# Patient Record
Sex: FEMALE | Race: WHITE | NOT HISPANIC OR LATINO | Employment: UNEMPLOYED | URBAN - NONMETROPOLITAN AREA
[De-identification: names, ages, dates, MRNs, and addresses within clinical notes are randomized per-mention and may not be internally consistent; named-entity substitution may affect disease eponyms.]

---

## 2017-01-12 ENCOUNTER — HOSPITAL ENCOUNTER (OUTPATIENT)
Dept: GENERAL RADIOLOGY | Facility: HOSPITAL | Age: 57
Discharge: HOME | End: 2017-01-12
Attending: OBSTETRICS & GYNECOLOGY

## 2017-12-19 ENCOUNTER — TRANSCRIBE ORDERS (OUTPATIENT)
Dept: ADMINISTRATIVE | Facility: HOSPITAL | Age: 57
End: 2017-12-19

## 2017-12-19 DIAGNOSIS — Z12.39 SCREENING FOR BREAST CANCER: Primary | ICD-10-CM

## 2018-01-18 ENCOUNTER — APPOINTMENT (OUTPATIENT)
Dept: MAMMOGRAPHY | Facility: HOSPITAL | Age: 58
End: 2018-01-18

## 2018-02-12 ENCOUNTER — HOSPITAL ENCOUNTER (OUTPATIENT)
Dept: MAMMOGRAPHY | Facility: HOSPITAL | Age: 58
Discharge: HOME | End: 2018-02-12
Admitting: OBSTETRICS & GYNECOLOGY

## 2018-02-12 DIAGNOSIS — Z12.39 SCREENING FOR BREAST CANCER: ICD-10-CM

## 2018-02-12 PROCEDURE — 77067 SCR MAMMO BI INCL CAD: CPT

## 2018-02-12 PROCEDURE — 77063 BREAST TOMOSYNTHESIS BI: CPT

## 2018-12-27 ENCOUNTER — TRANSCRIBE ORDERS (OUTPATIENT)
Dept: ADMINISTRATIVE | Facility: HOSPITAL | Age: 58
End: 2018-12-27

## 2018-12-27 DIAGNOSIS — Z12.31 ENCOUNTER FOR SCREENING MAMMOGRAM FOR MALIGNANT NEOPLASM OF BREAST: Primary | ICD-10-CM

## 2019-02-15 ENCOUNTER — HOSPITAL ENCOUNTER (OUTPATIENT)
Dept: MAMMOGRAPHY | Facility: HOSPITAL | Age: 59
Discharge: HOME | End: 2019-02-15
Admitting: OBSTETRICS & GYNECOLOGY

## 2019-02-15 DIAGNOSIS — Z12.31 ENCOUNTER FOR SCREENING MAMMOGRAM FOR MALIGNANT NEOPLASM OF BREAST: ICD-10-CM

## 2019-02-15 PROCEDURE — 77063 BREAST TOMOSYNTHESIS BI: CPT

## 2019-02-15 PROCEDURE — 77067 SCR MAMMO BI INCL CAD: CPT

## 2019-05-10 ENCOUNTER — OFFICE VISIT (OUTPATIENT)
Dept: OBSTETRICS AND GYNECOLOGY | Facility: CLINIC | Age: 59
End: 2019-05-10

## 2019-05-10 VITALS — HEIGHT: 63 IN | BODY MASS INDEX: 33.49 KG/M2 | WEIGHT: 189 LBS

## 2019-05-10 DIAGNOSIS — N76.0 ACUTE VAGINITIS: ICD-10-CM

## 2019-05-10 DIAGNOSIS — N90.89 LESION OF LABIA: ICD-10-CM

## 2019-05-10 DIAGNOSIS — N95.2 POSTMENOPAUSAL ATROPHIC VAGINITIS: Primary | ICD-10-CM

## 2019-05-10 PROCEDURE — 99204 OFFICE O/P NEW MOD 45 MIN: CPT | Performed by: OBSTETRICS & GYNECOLOGY

## 2019-05-10 RX ORDER — RANITIDINE 300 MG/1
TABLET ORAL
COMMUNITY
Start: 2019-04-18

## 2019-05-10 RX ORDER — URSODIOL 300 MG/1
1200 CAPSULE ORAL DAILY
Refills: 5 | COMMUNITY
Start: 2019-05-03

## 2019-05-10 RX ORDER — FLUTICASONE PROPIONATE 50 MCG
SPRAY, SUSPENSION (ML) NASAL
COMMUNITY
Start: 2019-04-18

## 2019-05-10 RX ORDER — CEFPODOXIME PROXETIL 200 MG/1
TABLET, FILM COATED ORAL
COMMUNITY
Start: 2019-02-19

## 2019-05-10 RX ORDER — ESTRADIOL 0.1 MG/G
CREAM VAGINAL
Qty: 1 EACH | Refills: 11 | Status: SHIPPED | OUTPATIENT
Start: 2019-05-10 | End: 2019-05-13 | Stop reason: SDUPTHER

## 2019-05-10 RX ORDER — METFORMIN HYDROCHLORIDE 500 MG/1
TABLET, EXTENDED RELEASE ORAL
COMMUNITY
Start: 2019-04-18

## 2019-05-10 RX ORDER — OMEGA-3-ACID ETHYL ESTERS 1 G/1
4 CAPSULE, LIQUID FILLED ORAL DAILY
Refills: 0 | COMMUNITY
Start: 2019-04-08

## 2019-05-10 RX ORDER — ALLOPURINOL 100 MG/1
TABLET ORAL
COMMUNITY
Start: 2019-04-18

## 2019-05-10 RX ORDER — LEVOTHYROXINE SODIUM 75 MCG
TABLET ORAL
COMMUNITY
Start: 2019-04-18

## 2019-05-10 RX ORDER — PROPRANOLOL HYDROCHLORIDE 10 MG/1
TABLET ORAL
Refills: 5 | COMMUNITY
Start: 2019-05-03

## 2019-05-10 RX ORDER — SERTRALINE HYDROCHLORIDE 25 MG/1
TABLET, FILM COATED ORAL
COMMUNITY
Start: 2019-04-18

## 2019-05-10 RX ORDER — ERGOCALCIFEROL 1.25 MG/1
CAPSULE ORAL
COMMUNITY
Start: 2019-04-18

## 2019-05-10 RX ORDER — LOSARTAN POTASSIUM 50 MG/1
TABLET ORAL
COMMUNITY
Start: 2019-04-18

## 2019-05-10 RX ORDER — PSEUDOEPHEDRINE HCL 30 MG/1
TABLET, FILM COATED ORAL
Refills: 0 | COMMUNITY
Start: 2019-02-13

## 2019-05-10 NOTE — PROGRESS NOTES
Subjective  Chief Complaint   Patient presents with   • LABIAL LESION     Patient advised has had sore lesion/bump on right labia x 4-5 months, also complains of recurrent yeast infections.      Patient is 59 y.o.  here as a new patient for evaluation of recurrent swelling of the vagina, skin lesion of the right labia.  Patient gives a history of having swelling and pain of the vagina and vulva for the last 4 to 5 months.  Patient reports she will have intense itching and irritation.  Patient reports she is used hot compresses as well as has tried medications over-the-counter.  Patient reports no improvement in her symptoms.  Patient reports also having a lesion of the right labia.  Patient reports the lesion has gotten smaller in nature.  Patient denies any drainage from the area patient.  Patient reports she can still feel a small nodule.  Patient reports her significant other could also feel the lesion.  The patient has had a previous hysterectomy with removal of her ovaries and tubes.  Patient is not on any hormone therapy orally or topically.  Patient reports being on hormone replacement therapy for a short time following her hysterectomy.    History  Past Medical History:   Diagnosis Date   • Anxiety    • Cirrhosis (CMS/HCC)    • Depression    • Diabetes mellitus (CMS/HCC)    • Disease of thyroid gland    • Gout    • Gout    • Hyperlipidemia    • Hypertension    • Liver cirrhosis (CMS/HCC)    • Osteoarthritis    • Urinary tract infection      Current Outpatient Medications on File Prior to Visit   Medication Sig Dispense Refill   • allopurinol (ZYLOPRIM) 100 MG tablet      • cefpodoxime (VANTIN) 200 MG tablet      • fluticasone (FLONASE) 50 MCG/ACT nasal spray      • losartan (COZAAR) 50 MG tablet      • metFORMIN ER (GLUCOPHAGE-XR) 500 MG 24 hr tablet      • NASAL DECONGESTANT 30 MG tablet   0   • omega-3 acid ethyl esters (LOVAZA) 1 g capsule Take 4 g by mouth Daily.  0   • ONE TOUCH ULTRA TEST test  "strip   3   • propranolol (INDERAL) 10 MG tablet TAKE 1/2 TABLET BY MOUTH IN THE MORNING AND 1 TABLET IN THE EVENING.  5   • raNITIdine (ZANTAC) 300 MG tablet      • sertraline (ZOLOFT) 25 MG tablet      • SYNTHROID 75 MCG tablet      • ursodiol (ACTIGALL) 300 MG capsule Take 1,200 mg by mouth Daily.  5   • vitamin D (ERGOCALCIFEROL) 53362 units capsule capsule        No current facility-administered medications on file prior to visit.      Allergies   Allergen Reactions   • Lipitor [Atorvastatin Calcium] Other (See Comments)     Patient advised unable to move arms or legs.    • Peanut-Containing Drug Products Swelling   • Ciprofloxacin Rash     Past Surgical History:   Procedure Laterality Date   • BILATERAL SALPINGO OOPHORECTOMY     • HYSTERECTOMY      LAVH   • PARATHYROIDECTOMY     • SPLENECTOMY       Family History   Problem Relation Age of Onset   • Coronary artery disease Father    • Colon cancer Mother    • Hypertension Mother    • Stroke Maternal Grandmother      Social History     Socioeconomic History   • Marital status:      Spouse name: Not on file   • Number of children: Not on file   • Years of education: Not on file   • Highest education level: Not on file   Tobacco Use   • Smoking status: Never Smoker   • Smokeless tobacco: Never Used   Substance and Sexual Activity   • Alcohol use: No     Frequency: Never   • Drug use: No   • Sexual activity: Not Currently     Partners: Male     Birth control/protection: Surgical     Review of Systems  All systems were reviewed and negative except for:  Constitution:  positive for trouble sleeping  ENT:  positive for nasal congestion  Gastrointestinal: postitive for  diarrhea  Musculoskeletal: positive for  joint pain  Behavioral/Psych: positive for  depression  Endocrine: positive for  hot flashes     Objective  Vitals:    05/10/19 1047   Weight: 85.7 kg (189 lb)   Height: 160 cm (63\")     Physical Exam:  General Appearance: alert, appears stated age and " cooperative  Head: normocephalic, without obvious abnormality and atraumatic  Eyes: lids and lashes normal, conjunctivae and sclerae normal, no icterus, no pallor, corneas clear and PERRLA  Ears: ears appear intact with no abnormalities noted  Nose: nares normal, septum midline, mucosa normal and no drainage  Neck: suppple, trachea midline and no thyromegaly  Lungs: clear to auscultation, respirations regular, respirations even and respirations unlabored  Heart: regular rhythm and normal rate, normal S1, S2, no murmur, gallop, or rubs and no click  Breasts: Not performed.  Abdomen: normal bowel sounds, no masses, no hepatomegaly, no splenomegaly, soft non-tender, no guarding and no rebound tenderness  Pelvic: Clinical staff was present for exam  External genitalia:  normal appearance of the external genitalia including Bartholin's and Barber's glands.  :  urethral meatus normal;  Vaginal:  atrophic mucosal changes are present; discharge present -  mucousy and white;  Cervix:  absent.  Uterus:  absent.  Adnexa:  non palpable bilaterally.  Extremities: moves extremities well, no edema, no cyanosis and no redness  Skin: no bleeding, bruising or rash and no lesions noted  Lymph Nodes: no palpable adenopathy  Neuro: CN II-X grossly intact; sensation intact  Psych: normal mood and affect, oriented to person, time and place, thought content organized and appropriate judgment  Lab Review   No data reviewed    Imaging   No data reviewed    Assessment/Plan  Problem List Items Addressed This Visit     None      Visit Diagnoses     Postmenopausal atrophic vaginitis    -  Primary  Discussed various options for relief of atrophic vaginal symptoms related to menopause. Discussed local therapy for treatment of vaginal symptoms only.  Discussed the different formulation options including cream, ring, and tablets.  Discussed the low risk of systemic absorption in postmenopausal women with atrophy using 25 mcgs of estradiol on a  daily basis.  Recommend low dose use 2-3x/wk for maintenance of treatment.  Other treatment options were discussed including the use of water-based and silicone-based vaginal lubricants and moisturizers.  Also discussed was the FDA approved treatment option of ospemifene for moderate to severe dyspareunia.  The patient is to follow-up in 4 to 6 weeks if no improvement in her symptoms.    Relevant Medications    estradiol (ESTRACE VAGINAL) 0.1 MG/GM vaginal cream    Acute vaginitis      Patient with vaginal discharge as noted today upon examination.  Cultures have been obtained today.  Plan pending results.  Patient gives a history of having frequent infections as noted above.  Will hold on treatment pending culture results.    Relevant Orders    NuSwab VG+ - Swab, Vagina (Completed)    Lesion of labia      Patient reports having a lesion of the right labia.  There are no abnormalities noted today. Patient's history is consistent with a probable sebaceous cyst.  Instructions and precautions have been given.  Patient is to follow-up with return of her symptoms.        Follow up as discussed/scheduled  This note was electronically signed.  Jemima Weathers M.D.

## 2019-05-13 LAB
A VAGINAE DNA VAG QL NAA+PROBE: NORMAL SCORE
BVAB2 DNA VAG QL NAA+PROBE: NORMAL SCORE
C ALBICANS DNA VAG QL NAA+PROBE: NEGATIVE
C GLABRATA DNA VAG QL NAA+PROBE: NEGATIVE
C TRACH RRNA SPEC QL NAA+PROBE: NEGATIVE
MEGA1 DNA VAG QL NAA+PROBE: NORMAL SCORE
N GONORRHOEA RRNA SPEC QL NAA+PROBE: NEGATIVE
T VAGINALIS RRNA SPEC QL NAA+PROBE: NEGATIVE

## 2019-05-13 RX ORDER — ESTRADIOL 0.1 MG/G
CREAM VAGINAL
Qty: 1 EACH | Refills: 11 | Status: SHIPPED | OUTPATIENT
Start: 2019-05-13 | End: 2019-05-13 | Stop reason: SDUPTHER

## 2019-05-13 RX ORDER — ESTRADIOL 0.1 MG/G
CREAM VAGINAL
Qty: 42.5 G | Refills: 11 | Status: SHIPPED | OUTPATIENT
Start: 2019-05-13 | End: 2019-05-17

## 2019-05-14 ENCOUNTER — TELEPHONE (OUTPATIENT)
Dept: OBSTETRICS AND GYNECOLOGY | Facility: CLINIC | Age: 59
End: 2019-05-14

## 2019-05-14 NOTE — TELEPHONE ENCOUNTER
----- Message from Britt Noyola sent at 5/14/2019 10:43 AM EDT -----  Contact: PT  PT SAW DR BARRON YESTERDAY AND WAS PRESCRIBED ESTRACE CREAM.  SHE SAID HER INSURANCE WILL COVER ONLY PREMARIN CREAM.  PLEASE SEND TO [facility]  THANKS

## 2020-01-02 ENCOUNTER — TRANSCRIBE ORDERS (OUTPATIENT)
Dept: ADMINISTRATIVE | Facility: HOSPITAL | Age: 60
End: 2020-01-02

## 2020-01-02 DIAGNOSIS — Z12.31 ENCOUNTER FOR SCREENING MAMMOGRAM FOR MALIGNANT NEOPLASM OF BREAST: Primary | ICD-10-CM

## 2020-02-17 ENCOUNTER — HOSPITAL ENCOUNTER (OUTPATIENT)
Dept: MAMMOGRAPHY | Facility: HOSPITAL | Age: 60
Discharge: HOME | End: 2020-02-17
Admitting: OBSTETRICS & GYNECOLOGY

## 2020-02-17 DIAGNOSIS — Z12.31 ENCOUNTER FOR SCREENING MAMMOGRAM FOR MALIGNANT NEOPLASM OF BREAST: ICD-10-CM

## 2020-02-17 PROCEDURE — 77067 SCR MAMMO BI INCL CAD: CPT

## 2020-02-17 PROCEDURE — 77063 BREAST TOMOSYNTHESIS BI: CPT

## 2020-10-26 ENCOUNTER — TRANSCRIBE ORDERS (OUTPATIENT)
Dept: ADMINISTRATIVE | Facility: HOSPITAL | Age: 60
End: 2020-10-26

## 2020-10-26 DIAGNOSIS — Z12.31 SCREENING MAMMOGRAM, ENCOUNTER FOR: Primary | ICD-10-CM

## 2021-02-19 ENCOUNTER — APPOINTMENT (OUTPATIENT)
Dept: MAMMOGRAPHY | Facility: HOSPITAL | Age: 61
End: 2021-02-19

## 2021-04-06 ENCOUNTER — HOSPITAL ENCOUNTER (OUTPATIENT)
Dept: MAMMOGRAPHY | Facility: HOSPITAL | Age: 61
Discharge: HOME | End: 2021-04-06
Admitting: OBSTETRICS & GYNECOLOGY

## 2021-04-06 DIAGNOSIS — Z12.31 SCREENING MAMMOGRAM, ENCOUNTER FOR: ICD-10-CM

## 2021-04-06 PROCEDURE — 77067 SCR MAMMO BI INCL CAD: CPT

## 2021-04-06 PROCEDURE — 77063 BREAST TOMOSYNTHESIS BI: CPT

## 2021-07-01 ENCOUNTER — OFFICE VISIT (OUTPATIENT)
Dept: OBSTETRICS AND GYNECOLOGY | Facility: CLINIC | Age: 61
End: 2021-07-01

## 2021-07-01 VITALS
HEIGHT: 63 IN | BODY MASS INDEX: 34.38 KG/M2 | DIASTOLIC BLOOD PRESSURE: 74 MMHG | WEIGHT: 194 LBS | SYSTOLIC BLOOD PRESSURE: 138 MMHG

## 2021-07-01 DIAGNOSIS — N76.0 ACUTE VAGINITIS: ICD-10-CM

## 2021-07-01 DIAGNOSIS — N89.8 VAGINAL DISCHARGE: ICD-10-CM

## 2021-07-01 DIAGNOSIS — N95.2 POSTMENOPAUSAL ATROPHIC VAGINITIS: ICD-10-CM

## 2021-07-01 DIAGNOSIS — R30.0 DYSURIA: Primary | ICD-10-CM

## 2021-07-01 PROCEDURE — 99214 OFFICE O/P EST MOD 30 MIN: CPT | Performed by: OBSTETRICS & GYNECOLOGY

## 2021-07-01 RX ORDER — CEPHALEXIN 500 MG/1
500 CAPSULE ORAL 4 TIMES DAILY
Qty: 28 CAPSULE | Refills: 0 | Status: SHIPPED | OUTPATIENT
Start: 2021-07-01 | End: 2021-07-08

## 2021-07-01 RX ORDER — FLUCONAZOLE 150 MG/1
TABLET ORAL
Qty: 2 TABLET | Refills: 0 | Status: SHIPPED | OUTPATIENT
Start: 2021-07-01 | End: 2023-03-16

## 2021-07-05 NOTE — PROGRESS NOTES
"Chief Complaint  Follow-up (Patient c/o vaginal discharge and burning for 1 week )     History of Present Illness:  Patient is 61 y.o.  who presents to [facility] here for evaluation of a vaginal discharge as well as burning and irritation.  Patient reports the discharge has been clear in nature.  Patient reports it has been itchy and irritated.  She has also had significant burning.  The patient also reports having dysuria and frequency.  Patient denies any flank pain.  She denies any fever or chills.  The patient reports she has been using her estrogen cream occasionally as previously given.  She has not been using this on a regular basis.    Physical Examination:  Vital Signs: /74   Ht 160 cm (63\")   Wt 88 kg (194 lb)   BMI 34.37 kg/m²     General Appearance: alert, appears stated age, and cooperative  Breasts: Not performed.  Abdomen: no masses, no hepatomegaly, no splenomegaly, soft non-tender, no guarding and no rebound tenderness  Pelvic: Clinical staff was present for exam  External genitalia:  normal appearance of the external genitalia including Bartholin's and [place]'s glands.  :  urethral meatus normal;  Vaginal:  atrophic mucosal changes are present; discharge present -  mucousy and white;  Cervix:  absent.  Uterus:  absent.  Adnexa:  non palpable bilaterally.  Cultures obtained    Data Review:  The following data was reviewed by: Jemima Weathers MD on 2021:     Labs:    Imaging:    Medical Records:  None    Assessment and Plan   Problem List Items Addressed This Visit     None      Visit Diagnoses     Dysuria    -  Primary  We will send urine today for clean-catch UA culture and sensitivity.  Prescriptions also given for Keflex as noted.  Patient is to call for her culture results.    Relevant Medications    cephalexin (Keflex) 500 MG capsule    Acute vaginitis      Patient with vaginal discharge as noted.  Prescriptions given for Diflucan as well as " Terazol.  Cultures have been obtained as well.    Relevant Medications    fluconazole (Diflucan) 150 MG tablet    terconazole (Terazol 7) 0.4 % vaginal cream    Other Relevant Orders    NuSwab VG+ - Swab, Vagina    Vaginal discharge      Cultures are obtained today as noted.  Prescriptions given for Diflucan and Terazol.  Patient is to call for her culture results.    Relevant Orders    NuSwab VG+ - Swab, Vagina    Postmenopausal atrophic vaginitis      Patient with continued atrophic changes as noted.  Patient has been informed to continue the use of her Premarin cream as previously given.  I discussed with the patient regular use of her estrogen cream 2-3 times per week.  Patient is also been instructed to apply to the periurethral area.          Follow Up/Instructions:  Follow up as noted.  Patient was given instructions and counseling regarding her condition or for health maintenance advice. Please see specific information pulled into the AVS if appropriate.     Note: Speech recognition transcription software may have been used to dictate portions of this document.  An attempt at proofreading has been made though minor errors in transcription may still be present.    This note was electronically signed.  Jemima Weathers M.D.

## 2021-07-06 LAB
A VAGINAE DNA VAG QL NAA+PROBE: NORMAL SCORE
BVAB2 DNA VAG QL NAA+PROBE: NORMAL SCORE
C ALBICANS DNA VAG QL NAA+PROBE: NEGATIVE
C GLABRATA DNA VAG QL NAA+PROBE: NEGATIVE
C TRACH DNA VAG QL NAA+PROBE: NEGATIVE
MEGA1 DNA VAG QL NAA+PROBE: NORMAL SCORE
N GONORRHOEA DNA VAG QL NAA+PROBE: NEGATIVE
T VAGINALIS DNA VAG QL NAA+PROBE: NEGATIVE

## 2022-03-03 ENCOUNTER — TRANSCRIBE ORDERS (OUTPATIENT)
Dept: ADMINISTRATIVE | Facility: HOSPITAL | Age: 62
End: 2022-03-03

## 2022-03-03 ENCOUNTER — OFFICE VISIT (OUTPATIENT)
Dept: OBSTETRICS AND GYNECOLOGY | Facility: CLINIC | Age: 62
End: 2022-03-03

## 2022-03-03 VITALS
WEIGHT: 202 LBS | BODY MASS INDEX: 35.79 KG/M2 | DIASTOLIC BLOOD PRESSURE: 60 MMHG | HEIGHT: 63 IN | SYSTOLIC BLOOD PRESSURE: 124 MMHG

## 2022-03-03 DIAGNOSIS — Z12.31 SCREENING MAMMOGRAM FOR BREAST CANCER: Primary | ICD-10-CM

## 2022-03-03 DIAGNOSIS — R30.9 PAINFUL URINATION: ICD-10-CM

## 2022-03-03 DIAGNOSIS — Z12.31 ENCOUNTER FOR SCREENING MAMMOGRAM FOR BREAST CANCER: ICD-10-CM

## 2022-03-03 DIAGNOSIS — Z01.419 ENCOUNTER FOR GYNECOLOGICAL EXAMINATION (GENERAL) (ROUTINE) WITHOUT ABNORMAL FINDINGS: Primary | ICD-10-CM

## 2022-03-03 DIAGNOSIS — N76.0 ACUTE VAGINITIS: ICD-10-CM

## 2022-03-03 DIAGNOSIS — N95.2 POSTMENOPAUSAL ATROPHIC VAGINITIS: ICD-10-CM

## 2022-03-03 PROCEDURE — 99396 PREV VISIT EST AGE 40-64: CPT | Performed by: OBSTETRICS & GYNECOLOGY

## 2022-03-03 RX ORDER — FLUCONAZOLE 150 MG/1
TABLET ORAL
Qty: 2 TABLET | Refills: 0 | Status: SHIPPED | OUTPATIENT
Start: 2022-03-03

## 2022-03-03 RX ORDER — CEPHALEXIN 500 MG/1
500 CAPSULE ORAL 4 TIMES DAILY
Qty: 28 CAPSULE | Refills: 0 | Status: SHIPPED | OUTPATIENT
Start: 2022-03-03 | End: 2022-03-10

## 2022-03-03 RX ORDER — CONJUGATED ESTROGENS 0.62 MG/G
CREAM VAGINAL
Qty: 30 G | Refills: 11 | Status: SHIPPED | OUTPATIENT
Start: 2022-03-03 | End: 2023-03-15 | Stop reason: SDUPTHER

## 2022-03-04 NOTE — PROGRESS NOTES
Chief Complaint  Gynecologic Exam     History of Present Illness:  Patient is 62 y.o.  who presents to [facility] here for her annual examination.  Patient reports she has not been using her estrogen cream consistently.  Patient has only been using it 1-2 times per month.  Patient does report having frequent urinary tract infections.  Patient was treated for UTI 3- 4 months ago.  Patient thinks she took Macrodantin.  Patient does report today having urinary frequency and discomfort.  Patient feels like she may have urinary tract infection at present.  Patient denies any flank pain.  She denies any fever or chills.  Patient sees Dr. Wilhelm for her primary care.  Patient reports no major changes in her health otherwise.  Patient is due for a mammogram next month.    History  Past Medical History:   Diagnosis Date   • Anxiety    • Cirrhosis (HCC)    • Depression    • Diabetes mellitus (HCC)    • Disease of thyroid gland    • Gout    • Gout    • Hyperlipidemia    • Hypertension    • Liver cirrhosis (HCC)    • Osteoarthritis    • Urinary tract infection      Current Outpatient Medications on File Prior to Visit   Medication Sig Dispense Refill   • allopurinol (ZYLOPRIM) 100 MG tablet      • cefpodoxime (VANTIN) 200 MG tablet      • fluconazole (Diflucan) 150 MG tablet 1 po now and repeat in 3 d. 2 tablet 0   • fluticasone (FLONASE) 50 MCG/ACT nasal spray      • losartan (COZAAR) 50 MG tablet      • metFORMIN ER (GLUCOPHAGE-XR) 500 MG 24 hr tablet      • NASAL DECONGESTANT 30 MG tablet   0   • omega-3 acid ethyl esters (LOVAZA) 1 g capsule Take 4 g by mouth Daily.  0   • ONE TOUCH ULTRA TEST test strip   3   • propranolol (INDERAL) 10 MG tablet TAKE 1/2 TABLET BY MOUTH IN THE MORNING AND 1 TABLET IN THE EVENING.  5   • sertraline (ZOLOFT) 25 MG tablet      • SYNTHROID 75 MCG tablet      • ursodiol (ACTIGALL) 300 MG capsule Take 1,200 mg by mouth Daily.  5   • vitamin D (ERGOCALCIFEROL) 14083  "units capsule capsule      • raNITIdine (ZANTAC) 300 MG tablet        No current facility-administered medications on file prior to visit.     Allergies   Allergen Reactions   • Lipitor [Atorvastatin Calcium] Other (See Comments)     Patient advised unable to move arms or legs.    • Peanut-Containing Drug Products Swelling   • Ciprofloxacin Rash     Past Surgical History:   Procedure Laterality Date   • BILATERAL SALPINGO OOPHORECTOMY     • HYSTERECTOMY      LAVH   • PARATHYROIDECTOMY     • SPLENECTOMY       Family History   Problem Relation Age of Onset   • Coronary artery disease Father    • Colon cancer Mother    • Hypertension Mother    • Stroke Maternal Grandmother      Social History     Socioeconomic History   • Marital status:    Tobacco Use   • Smoking status: Never Smoker   • Smokeless tobacco: Never Used   Substance and Sexual Activity   • Alcohol use: No   • Drug use: No   • Sexual activity: Not Currently     Partners: Male     Birth control/protection: Surgical       Physical Examination:  Vital Signs: /60   Ht 160 cm (63\")   Wt 91.6 kg (202 lb)   BMI 35.78 kg/m²     General Appearance: alert, appears stated age, and cooperative  Breasts: Examined in supine position  Symmetric without masses or skin dimpling  Nipples normal without inversion, lesions or discharge  There are no palpable axillary nodes  Abdomen: no masses, no hepatomegaly, no splenomegaly, soft non-tender, no guarding and no rebound tenderness  Pelvic: Clinical staff was present for exam  External genitalia:  normal appearance of the external genitalia including Bartholin's and [place]'s glands.  :  urethral meatus normal;  Vaginal:  atrophic mucosal changes are present;  White discharge noted at vaginal apex.  Cervix:  absent.  Uterus:  absent.  Adnexa:  non palpable bilaterally.  Pap smear done and specimen sent using Thin-Prep technique  Cultures obtained    Data Review:  The following data was reviewed by: Jemima MONTANEZ" MD Jasiel on 03/03/2022:     Labs:    Imaging:  Mammo Screening Digital Tomosynthesis Bilateral With CAD (04/06/2021 08:52)    Medical Records:  None    Assessment and Plan   Problem List Items Addressed This Visit     None      Visit Diagnoses     Encounter for gynecological examination (general) (routine) without abnormal findings    -  Primary  Pap was done today.  If she does not receive the results of the Pap within 2 weeks  time, she was instructed to call to find out the results.  I explained to Nadine that the recommendations for Pap smear interval in a low risk patient has lengthened to 3 years time if cytology alone normal or  5 years time if both cytology and HPV testing were normal.  I encouraged her to be seen yearly for a full physical exam including breast and pelvic exam even during the off years when PAP's will not be performed.    Relevant Orders    Pap IG, Rfx HPV ASCU    Encounter for screening mammogram for breast cancer      It is recommended per ACOG, for women at average risk to start annual mammogram screening at the age of 40 until the age of 75 and an individualized decision be made for women after age 75.  She was encouraged to continue getting yearly mammograms.  She should report any palpable breast lump(s) or skin changes regardless of mammographic findings.  I explained to Nadine that notification regarding her mammogram results will come from the center performing the study.  Our office will not be routinely calling with mammogram results.  It is her responsibility to make sure that the results from the mammogram are communicated to her by the breast center.  If she has any questions about the results, she is welcome to call our office anytime.  The patient reports she will schedule her mammogram.    Nadine was counseled regarding having clinical breast exams and breast self-awareness.  Women aged 29-39 years of age should have clinical breast exams every 1-3 years and yearly aged 40 and  older.  The patient was counseled regarding breast self-awareness focusing on having a sense of what is normal for her breasts so that she can tell if there are changes.  Even small changes should be reported to provider.    Relevant Orders    Mammo Screening Digital Tomosynthesis Bilateral With CAD    Painful urination      We will send urine for clean-catch UA culture and sensitivity.  Prescriptions given for Keflex.  Patient is to call for the results.    Relevant Medications    cephalexin (Keflex) 500 MG capsule    Other Relevant Orders    Urine Culture - Urine, Urine, Clean Catch    Urinalysis With Microscopic - Urine, Clean Catch    Acute vaginitis      Patient with vaginal discharge as noted.  Patient is also been given a prescription for Keflex as noted.  Prescription is given for Diflucan as well.  Instructions and precautions were given.  Patient is to call for culture results.    Relevant Medications    fluconazole (Diflucan) 150 MG tablet    Other Relevant Orders    NuSwab VG+ - Swab, Vagina    Postmenopausal atrophic vaginitis      Patient with continued atrophic changes as noted.  I discussed with the patient the need for regular use of her estrogen cream.  Patient is instructed to use twice weekly and apply to the periurethral area as well.    Relevant Medications    conjugated estrogens (Premarin) 0.625 MG/GM vaginal cream          Follow Up/Instructions:  Follow up as noted.  Patient was given instructions and counseling regarding her condition or for health maintenance advice. Please see specific information pulled into the AVS if appropriate.     Note: Speech recognition transcription software may have been used to dictate portions of this document.  An attempt at proofreading has been made though minor errors in transcription may still be present.    This note was electronically signed.  Jemima Weathers M.D.

## 2022-03-06 LAB
A VAGINAE DNA VAG QL NAA+PROBE: NORMAL SCORE
APPEARANCE UR: CLEAR
BACTERIA #/AREA URNS HPF: ABNORMAL /HPF
BACTERIA UR CULT: NORMAL
BACTERIA UR CULT: NORMAL
BILIRUB UR QL STRIP: NEGATIVE
BVAB2 DNA VAG QL NAA+PROBE: NORMAL SCORE
C ALBICANS DNA VAG QL NAA+PROBE: NEGATIVE
C GLABRATA DNA VAG QL NAA+PROBE: NEGATIVE
C TRACH DNA VAG QL NAA+PROBE: NEGATIVE
CASTS URNS MICRO: ABNORMAL
COLOR UR: YELLOW
EPI CELLS #/AREA URNS HPF: ABNORMAL /HPF
GLUCOSE UR QL STRIP: NEGATIVE
HGB UR QL STRIP: NEGATIVE
KETONES UR QL STRIP: NEGATIVE
LEUKOCYTE ESTERASE UR QL STRIP: ABNORMAL
MEGA1 DNA VAG QL NAA+PROBE: NORMAL SCORE
N GONORRHOEA DNA VAG QL NAA+PROBE: NEGATIVE
NITRITE UR QL STRIP: NEGATIVE
PH UR STRIP: 6 [PH] (ref 5–8)
PROT UR QL STRIP: NEGATIVE
RBC #/AREA URNS HPF: ABNORMAL /HPF
SP GR UR STRIP: 1.02 (ref 1–1.03)
T VAGINALIS DNA VAG QL NAA+PROBE: NEGATIVE
UROBILINOGEN UR STRIP-MCNC: ABNORMAL MG/DL
WBC #/AREA URNS HPF: ABNORMAL /HPF

## 2022-03-15 DIAGNOSIS — Z01.419 ENCOUNTER FOR GYNECOLOGICAL EXAMINATION (GENERAL) (ROUTINE) WITHOUT ABNORMAL FINDINGS: ICD-10-CM

## 2022-04-22 ENCOUNTER — HOSPITAL ENCOUNTER (OUTPATIENT)
Dept: MAMMOGRAPHY | Facility: HOSPITAL | Age: 62
Discharge: HOME | End: 2022-04-22
Admitting: OBSTETRICS & GYNECOLOGY

## 2022-04-22 DIAGNOSIS — Z12.31 SCREENING MAMMOGRAM FOR BREAST CANCER: ICD-10-CM

## 2022-04-22 PROCEDURE — 77063 BREAST TOMOSYNTHESIS BI: CPT

## 2022-04-22 PROCEDURE — 77067 SCR MAMMO BI INCL CAD: CPT

## 2023-03-16 ENCOUNTER — OFFICE VISIT (OUTPATIENT)
Dept: OBSTETRICS AND GYNECOLOGY | Facility: CLINIC | Age: 63
End: 2023-03-16
Payer: COMMERCIAL

## 2023-03-16 VITALS
DIASTOLIC BLOOD PRESSURE: 70 MMHG | BODY MASS INDEX: 31.54 KG/M2 | SYSTOLIC BLOOD PRESSURE: 122 MMHG | HEIGHT: 63 IN | WEIGHT: 178 LBS

## 2023-03-16 DIAGNOSIS — N95.2 POSTMENOPAUSAL ATROPHIC VAGINITIS: ICD-10-CM

## 2023-03-16 DIAGNOSIS — Z12.31 ENCOUNTER FOR SCREENING MAMMOGRAM FOR BREAST CANCER: ICD-10-CM

## 2023-03-16 DIAGNOSIS — Z01.419 ENCOUNTER FOR GYNECOLOGICAL EXAMINATION WITHOUT ABNORMAL FINDING: Primary | ICD-10-CM

## 2023-03-16 DIAGNOSIS — Z12.4 SCREENING FOR MALIGNANT NEOPLASM OF CERVIX: ICD-10-CM

## 2023-03-16 PROCEDURE — 1159F MED LIST DOCD IN RCRD: CPT | Performed by: MIDWIFE

## 2023-03-16 PROCEDURE — 99396 PREV VISIT EST AGE 40-64: CPT | Performed by: MIDWIFE

## 2023-03-16 PROCEDURE — 1160F RVW MEDS BY RX/DR IN RCRD: CPT | Performed by: MIDWIFE

## 2023-03-16 RX ORDER — CONJUGATED ESTROGENS 0.62 MG/G
CREAM VAGINAL
Qty: 30 G | Refills: 11 | Status: SHIPPED | OUTPATIENT
Start: 2023-03-16

## 2023-03-16 NOTE — PROGRESS NOTES
"Subjective   Chief Complaint   Patient presents with   • Gynecologic Exam     C/o burning with urination      Nadine Gilliland is a 63 y.o. year old  presenting to be seen for her annual exam.   She has noticed some dysuria and vaginal burning.   She is using Premarin cream once weekly.  Mammogram is due    Past Medical History:   Diagnosis Date   • Anxiety    • Cirrhosis (HCC)    • Depression    • Diabetes mellitus (HCC)    • Disease of thyroid gland    • Gout    • Gout    • Hyperlipidemia    • Hypertension    • Liver cirrhosis (HCC)    • Osteoarthritis    • Urinary tract infection       Past Surgical History:   Procedure Laterality Date   • BILATERAL SALPINGO OOPHORECTOMY     • HYSTERECTOMY      LAVH   • PARATHYROIDECTOMY     • SPLENECTOMY            The following portions of the patient's history were reviewed and updated as appropriate:problem list, current medications, allergies, past family history, past medical history, past social history and past surgical history.    Review of Systems   Constitutional: Negative.    Respiratory: Negative.    Cardiovascular: Negative.    Gastrointestinal: Negative.    Genitourinary: Positive for dysuria.   Musculoskeletal: Negative.    Psychiatric/Behavioral: Negative.    All other systems reviewed and are negative.          Objective   /70   Ht 160 cm (63\")   Wt 80.7 kg (178 lb)   LMP  (LMP Unknown)   BMI 31.53 kg/m²     Physical Exam   Constitutional   The patient is awake, alert, well developed, well nourished and well groomed.   Neck   The neck is supple and the trachea is midline. The thyroid is not enlarged and there are no palpable nodules.   Breast  Inspection of the breast reveals symmetrical and smooth breast bilaterally without skin color changes, lesions, dimpling or skin retraction.  The nipples are  everted and without discharge.  Palpation of the breast tissue is non-tender, smooth, without masses.  The axillae are without masses. "   Respiratory  The patient is relaxed and breathes without effort. Lungs CTAB  Cardiovascular  Heart regular rate and rhythm without murmur.  Gastrointestinal   The abdomen is soft and non-tender.  No hepatosplenomegaly.  Genitourinary   - External Genitalia without erythema, lesions, or masses  -Urethral Meatus is without erythema, edema, prolapse or lesions.   -Bladder - Palpation at the region above the symphysis pubis             reveals no bladder tenderness.   -Vagina - There is no excessive vaginal discharge.   Vaginal walls reveals moist vaginal mucosa without inflammation or lesions    There is no evidence of pelvic relaxation; there is no cystocele or rectocele.  -Cervix  is absent  Uterus - absent  Adnexa structures are absent  Perineum is without inflammation or lesions   Extremities  Full ROM. No cyanosis or edema   Skin  Normal in color, texture, moisture, temperature, mobility and turgor. There are no abnormal lesions. Vitiligo   Psychiatric  The patient is oriented to person, place, and time. Speech is fluent and words are clear          Assessment /Plan      Diagnoses and all orders for this visit:    1. Encounter for gynecological examination without abnormal finding (Primary)  Encouraged healthy eating and exercise  2. Screening for malignant neoplasm of cervix  -     LIQUID-BASED PAP SMEAR WITH HPV GENOTYPING REGARDLESS OF INTERPRETATION ([facility])    3. Encounter for screening mammogram for breast cancer  -     Mammo Screening Digital Tomosynthesis Bilateral With CAD  Encouraged self breast exam  4. Postmenopausal atrophic vaginitis  -     Estrogens Conjugated (Premarin) 0.625 MG/GM vaginal cream; 0.5 GRAMS INSERT VAGINALLY AT BEDTIME TWICE WEEKLY  Dispense: 30 g; Refill: 11               This note was electronically signed.    Sienna Montanez CNM   March 16, 2023

## 2023-03-17 LAB — REF LAB TEST METHOD: NORMAL

## 2023-05-11 ENCOUNTER — TRANSCRIBE ORDERS (OUTPATIENT)
Dept: ADMINISTRATIVE | Facility: HOSPITAL | Age: 63
End: 2023-05-11
Payer: COMMERCIAL

## 2023-05-11 DIAGNOSIS — N63.10 MASS OF RIGHT BREAST, UNSPECIFIED QUADRANT: Primary | ICD-10-CM

## 2023-05-17 ENCOUNTER — TELEPHONE (OUTPATIENT)
Dept: OBSTETRICS AND GYNECOLOGY | Facility: CLINIC | Age: 63
End: 2023-05-17

## 2023-05-17 NOTE — TELEPHONE ENCOUNTER
Provider: DR BARRON   Caller: ARLENE MORRIS   Relationship to Patient: SELF   Pharmacy: [facility]   Phone Number: [phone]  Reason for Call: PT HAS A SPOT COME UP ON THE FATTY PART RIGHT NEXT TO HER VAGINA  - IT STARTED OUT AS A SMALL RED BUMP A COUPLE MONTHS AGO AND HAS SLOWLY PROGRESSED INTO A BIG BLACK BLOOD BLISTER LOOKING THING. PT WANTED TO BE SEEN TOMORROW IF POSSIBLE PLEASE CALL PT   When was the patient last seen: 3/2023

## 2023-05-18 ENCOUNTER — OFFICE VISIT (OUTPATIENT)
Dept: OBSTETRICS AND GYNECOLOGY | Facility: CLINIC | Age: 63
End: 2023-05-18
Payer: COMMERCIAL

## 2023-05-18 ENCOUNTER — OFFICE VISIT (OUTPATIENT)
Dept: SURGERY | Facility: CLINIC | Age: 63
End: 2023-05-18
Payer: COMMERCIAL

## 2023-05-18 VITALS
BODY MASS INDEX: 32.18 KG/M2 | SYSTOLIC BLOOD PRESSURE: 110 MMHG | HEIGHT: 63 IN | DIASTOLIC BLOOD PRESSURE: 70 MMHG | WEIGHT: 181.6 LBS

## 2023-05-18 VITALS
HEART RATE: 68 BPM | BODY MASS INDEX: 32.43 KG/M2 | DIASTOLIC BLOOD PRESSURE: 80 MMHG | SYSTOLIC BLOOD PRESSURE: 122 MMHG | HEIGHT: 63 IN | TEMPERATURE: 97.3 F | WEIGHT: 183 LBS | OXYGEN SATURATION: 96 %

## 2023-05-18 DIAGNOSIS — R22.31 MASS OF RIGHT AXILLA: Primary | ICD-10-CM

## 2023-05-18 DIAGNOSIS — T14.8XXA BLOOD BLISTER: Primary | ICD-10-CM

## 2023-05-18 DIAGNOSIS — N90.89 VULVAR LESION: ICD-10-CM

## 2023-05-18 RX ORDER — MONTELUKAST SODIUM 10 MG/1
TABLET ORAL
COMMUNITY
Start: 2023-05-05

## 2023-05-18 RX ORDER — SEMAGLUTIDE 1.34 MG/ML
INJECTION, SOLUTION SUBCUTANEOUS
COMMUNITY
Start: 2023-04-26

## 2023-05-18 RX ORDER — FAMOTIDINE 20 MG/1
20 TABLET, FILM COATED ORAL 2 TIMES DAILY
COMMUNITY

## 2023-05-18 RX ORDER — LEVOCETIRIZINE DIHYDROCHLORIDE 5 MG/1
TABLET, FILM COATED ORAL
COMMUNITY
Start: 2023-05-05

## 2023-05-18 RX ORDER — DIPHENHYDRAMINE HYDROCHLORIDE 25 MG/1
CAPSULE ORAL
COMMUNITY
Start: 2023-03-21

## 2023-05-18 RX ORDER — LEVOTHYROXINE SODIUM 88 MCG
TABLET ORAL
COMMUNITY
Start: 2023-03-01

## 2023-05-18 RX ORDER — FERROUS SULFATE 325(65) MG
325 TABLET ORAL DAILY
COMMUNITY

## 2023-05-18 NOTE — PROGRESS NOTES
Subjective   Chief Complaint   Patient presents with   • Follow-up     Patient states she has a black spot in vaginal area       Nadine Gilliland is a 63 y.o. year old  presenting to be seen for vulvar lesion.  States about 2 months ago she noticed a very small red spot on the left labia majora which was slightly itchy.  Recently it is changed and looks more blackish.  It is not changed in size.  Patient has been squeezing it trying to get it to go away.  She states she had found a tick recently on her right leg but does not think this is a tick.  No drainage or bleeding from the area    Past Medical History:   Diagnosis Date   • Anxiety    • Cirrhosis    • Depression    • Diabetes mellitus    • Disease of thyroid gland    • Gout    • Gout    • Hyperlipidemia    • Hypertension    • Liver cirrhosis    • Osteoarthritis    • Urinary tract infection         Current Outpatient Medications:   •  allopurinol (ZYLOPRIM) 100 MG tablet, , Disp: , Rfl:   •  Banophen 25 MG capsule, , Disp: , Rfl:   •  cefpodoxime (VANTIN) 200 MG tablet, , Disp: , Rfl:   •  Estrogens Conjugated (Premarin) 0.625 MG/GM vaginal cream, 0.5 GRAMS INSERT VAGINALLY AT BEDTIME TWICE WEEKLY, Disp: 30 g, Rfl: 11  •  ferrous sulfate 325 (65 FE) MG tablet, Take 1 tablet by mouth Daily., Disp: , Rfl:   •  fluticasone (FLONASE) 50 MCG/ACT nasal spray, , Disp: , Rfl:   •  levocetirizine (XYZAL) 5 MG tablet, , Disp: , Rfl:   •  losartan (COZAAR) 50 MG tablet, , Disp: , Rfl:   •  metFORMIN ER (GLUCOPHAGE-XR) 500 MG 24 hr tablet, , Disp: , Rfl:   •  montelukast (SINGULAIR) 10 MG tablet, , Disp: , Rfl:   •  NASAL DECONGESTANT 30 MG tablet, , Disp: , Rfl: 0  •  omega-3 acid ethyl esters (LOVAZA) 1 g capsule, Take 4 capsules by mouth Daily., Disp: , Rfl: 0  •  ONE TOUCH ULTRA TEST test strip, , Disp: , Rfl: 3  •  Ozempic, 1 MG/DOSE, 4 MG/3ML solution pen-injector, , Disp: , Rfl:   •  raNITIdine (ZANTAC) 300 MG tablet, , Disp: , Rfl:   •  sertraline (ZOLOFT) 25 MG  "tablet, , Disp: , Rfl:   •  Synthroid 88 MCG tablet, , Disp: , Rfl:   •  ursodiol (ACTIGALL) 300 MG capsule, Take 4 capsules by mouth Daily., Disp: , Rfl: 5  •  vitamin D (ERGOCALCIFEROL) 37610 units capsule capsule, , Disp: , Rfl:    Allergies   Allergen Reactions   • Atorvastatin Other (See Comments)     Patient advised unable to move arms or legs.    • Lipitor [Atorvastatin Calcium] Other (See Comments)     Patient advised unable to move arms or legs.    • Contrast Dye (Echo Or Unknown Ct/Mr) Hives   • Peanut-Containing Drug Products Swelling   • Ciprofloxacin Rash   • Nsaids Unknown (See Comments)   • Sulfa Antibiotics Unknown (See Comments)      Past Surgical History:   Procedure Laterality Date   • BILATERAL SALPINGO OOPHORECTOMY     • HYSTERECTOMY      LAVH   • PARATHYROIDECTOMY     • SPLENECTOMY        Social History     Socioeconomic History   • Marital status:    Tobacco Use   • Smoking status: Never   • Smokeless tobacco: Never   Vaping Use   • Vaping Use: Never used   Substance and Sexual Activity   • Alcohol use: No   • Drug use: No   • Sexual activity: Not Currently     Partners: Male     Birth control/protection: Surgical, Hysterectomy      Family History   Problem Relation Age of Onset   • Coronary artery disease Father    • Colon cancer Mother    • Hypertension Mother    • Stroke Maternal Grandmother        Review of Systems   Constitutional: Negative for chills, diaphoresis and fever.   Gastrointestinal: Negative for constipation, diarrhea, nausea and vomiting.   Genitourinary: Negative for difficulty urinating, dysuria, vaginal bleeding, vaginal discharge and vaginal pain.           Objective   /70   Ht 160 cm (63\")   Wt 82.4 kg (181 lb 9.6 oz)   LMP  (LMP Unknown)   BMI 32.17 kg/m²     Physical Exam  Constitutional:       Appearance: Normal appearance. She is well-developed and well-groomed.   Eyes:      General: Lids are normal.      Extraocular Movements: Extraocular movements " intact.      Conjunctiva/sclera: Conjunctivae normal.   Genitourinary:         Comments: Small 2 mm purplish spot left outer labia majora.  Appears consistent with small blood blister and scab formation  Top layer removed with pick ups and purplish spot resolved  Neurological:      Mental Status: She is alert.   Psychiatric:         Attention and Perception: Attention normal.         Mood and Affect: Mood normal.         Speech: Speech normal.         Behavior: Behavior is cooperative.            Result Review :                   Assessment and Plan  Diagnoses and all orders for this visit:    1. Blood blister (Primary)    2. Vulvar lesion      Patient Instructions   Follow up 3 weeks to reexamine but small lesion appears to be blood blister              This note was electronically signed.    Claire Campbell PA-C   May 18, 2023

## 2023-05-18 NOTE — PROGRESS NOTES
Patient: Nadine Gilliland    YOB: 1960    Date: 05/18/2023    Primary Care Provider: Saleem Baumann MD    Chief Complaint   Patient presents with   • Mass     Right axilla mass       SUBJECTIVE:    History of present illness:  The patient is in the office today for evaluation and treatment of a palpable mass in the right axilla. Ultrasound was done on 5/10/23 which showed prominent lymph nodes in the right axilla. These may be reactive. She states that she first notice lump under right armpit around one month ago. Patient denies change in size.  Patient denies pain.    She does have a history significant for Hashimoto's thyroiditis and Mojica syndrome.  She has had a mass present in the right axilla over the past several weeks, previous ultrasound about a week ago showed evidence of a 5 cm lymphadenopathy in the right axilla.  She also had a palpable lymph node behind the right knee a week ago but this is now resolved.    The following portions of the patient's history were reviewed and updated as appropriate: allergies, current medications, past family history, past medical history, past social history, past surgical history and problem list.      Review of Systems   Constitutional: Positive for fatigue. Negative for fever and unexpected weight change.   HENT: Negative for trouble swallowing.    Gastrointestinal: Negative for abdominal distention, abdominal pain, anal bleeding, blood in stool, constipation, diarrhea, nausea, rectal pain and vomiting.   Skin: Negative for color change, pallor, rash and wound.       Allergies:  Allergies   Allergen Reactions   • Atorvastatin Other (See Comments)     Patient advised unable to move arms or legs.    • Lipitor [Atorvastatin Calcium] Other (See Comments)     Patient advised unable to move arms or legs.    • Contrast Dye (Echo Or Unknown Ct/Mr) Hives   • Peanut-Containing Drug Products Swelling   • Ciprofloxacin Rash   • Nsaids Unknown (See Comments)   • Sulfa  Antibiotics Unknown (See Comments)       Medications:    Current Outpatient Medications:   •  allopurinol (ZYLOPRIM) 100 MG tablet, , Disp: , Rfl:   •  Estrogens Conjugated (Premarin) 0.625 MG/GM vaginal cream, 0.5 GRAMS INSERT VAGINALLY AT BEDTIME TWICE WEEKLY, Disp: 30 g, Rfl: 11  •  famotidine (Pepcid) 20 MG tablet, Take 1 tablet by mouth 2 (Two) Times a Day., Disp: , Rfl:   •  ferrous sulfate 325 (65 FE) MG tablet, Take 1 tablet by mouth Daily., Disp: , Rfl:   •  fluticasone (FLONASE) 50 MCG/ACT nasal spray, , Disp: , Rfl:   •  levocetirizine (XYZAL) 5 MG tablet, , Disp: , Rfl:   •  losartan (COZAAR) 50 MG tablet, , Disp: , Rfl:   •  metFORMIN ER (GLUCOPHAGE-XR) 500 MG 24 hr tablet, , Disp: , Rfl:   •  montelukast (SINGULAIR) 10 MG tablet, , Disp: , Rfl:   •  NASAL DECONGESTANT 30 MG tablet, , Disp: , Rfl: 0  •  omega-3 acid ethyl esters (LOVAZA) 1 g capsule, Take 4 capsules by mouth Daily., Disp: , Rfl: 0  •  ONE TOUCH ULTRA TEST test strip, , Disp: , Rfl: 3  •  Ozempic, 1 MG/DOSE, 4 MG/3ML solution pen-injector, , Disp: , Rfl:   •  sertraline (ZOLOFT) 25 MG tablet, , Disp: , Rfl:   •  Synthroid 88 MCG tablet, , Disp: , Rfl:   •  ursodiol (ACTIGALL) 300 MG capsule, Take 4 capsules by mouth Daily., Disp: , Rfl: 5  •  vitamin D (ERGOCALCIFEROL) 34644 units capsule capsule, , Disp: , Rfl:   •  Banophen 25 MG capsule, , Disp: , Rfl:   •  cefpodoxime (VANTIN) 200 MG tablet, , Disp: , Rfl:     History:  Past Medical History:   Diagnosis Date   • Anemia 2023   • Anxiety    • Cirrhosis    • Colon polyp 3 yrs ago   • Depression    • Diabetes mellitus    • Disease of thyroid gland    • Diverticulitis of colon 8 yrs ago   • Gout    • Gout    • History of transfusion 1986   • Hyperlipidemia    • Hypertension    • Liver cirrhosis    • Osteoarthritis    • Urinary tract infection        Past Surgical History:   Procedure Laterality Date   • BILATERAL SALPINGO OOPHORECTOMY     • HYSTERECTOMY      LAVH   • PARATHYROIDECTOMY    "  • SPLENECTOMY         Family History   Problem Relation Age of Onset   • Coronary artery disease Father    • Colon cancer Mother    • Hypertension Mother    • Stroke Maternal Grandmother    • Arthritis Daughter    • Depression Daughter    • Diabetes Sister        Social History     Tobacco Use   • Smoking status: Never     Passive exposure: Never   • Smokeless tobacco: Never   Vaping Use   • Vaping Use: Never used   Substance Use Topics   • Alcohol use: No   • Drug use: No        OBJECTIVE:    Vital Signs:   Vitals:    05/18/23 1454   BP: 122/80   Pulse: 68   Temp: 97.3 °F (36.3 °C)   SpO2: 96%   Weight: 83 kg (183 lb)   Height: 160 cm (63\")       Physical Exam:   General Appearance:    Alert, cooperative, in no acute distress   Head:    Normocephalic, without obvious abnormality, atraumatic   Eyes:            Lids and lashes normal, conjunctivae and sclerae normal, no   icterus, no pallor, corneas clear, PERRLA   Ears:    Ears appear intact with no abnormalities noted   Throat:   No oral lesions, no thrush, oral mucosa moist   Neck:   No adenopathy, supple, trachea midline, no thyromegaly, no   carotid bruit, no JVD   Lungs:     Clear to auscultation,respirations regular, even and                  unlabored    Heart:    Regular rhythm and normal rate, normal S1 and S2, no            murmur, no gallop, no rub, no click   Chest Wall:    No abnormalities observed   Abdomen:     Normal bowel sounds, no masses, no organomegaly, soft        non-tender, non-distended, no guarding, no rebound                tenderness, no peritoneal signs   Extremities:   Moves all extremities well, no edema, no cyanosis, no             redness   Pulses:   Pulses palpable and equal bilaterally   Skin:   No bleeding, bruising or rash, both breast are palpably normal, right axilla shows some fullness but no obvious mass   Lymph nodes:   No palpable adenopathy   Neurologic:   Cranial nerves 2 - 12 grossly intact, sensation intact, DTR       " present and equal bilaterally     Results Review:   I reviewed the patient's new clinical results.  I reviewed the patient's new imaging results and agree with the interpretation.  I reviewed the patient's other test results and agree with the interpretation    Review of Systems was reviewed and confirmed as accurate as documented by the MA.    ASSESSMENT/PLAN:    1. Mass of right axilla        I did have a detailed and extensive discussion in the office today with the patient.  Our ultrasound showed some lymph nodes in the right axilla that measured 3 cm in size and I think what ever caused her lymphadenopathy is resolving.  She is scheduled for upcoming bilateral mammogram which I have asked her to keep this appointment, I want to see her back in the [facility] office after mammography.    I discussed the patients findings and my recommendations with patient        Electronically signed by Hawk Zuniga MD  05/18/23

## 2023-05-30 ENCOUNTER — TELEPHONE (OUTPATIENT)
Dept: SURGERY | Facility: CLINIC | Age: 63
End: 2023-05-30

## 2023-05-30 NOTE — TELEPHONE ENCOUNTER
Caller: ARLENE MORRIS    Relationship: SELF     Best call back number: [phone]    What orders are you requesting (i.e. lab or imaging): BILATERAL MAMMOGRAM     In what timeframe would the patient need to come in: ASAP    Where will you receive your lab/imaging services:     Additional notes: PT WAS SCHEDULED FOR June FOR A BILATERAL MAMMO BUT ONE OF HER PROVIDER'S CANCELLED AND RESCHEDULED HER FOR DIAGNOSTIC MAMMO. PLEASE GIVE HER A CALL IF ABLE TO SEND ORDERS OR NOT.

## 2023-05-31 NOTE — TELEPHONE ENCOUNTER
Per Dr. Zuniga, bilateral diagnostic mammogram was ordered and scheduled for 06/13/2023 @ 10:30am, I left a message on voice mail asking pt to return my call.

## 2023-05-31 NOTE — TELEPHONE ENCOUNTER
"I talked with pt, she stated \"I don't know if I need a regular mammogram of the diagnostic mammogram.\"  I told her that I will talk with Dr. Zuniga this afternoon and let her know.  "

## 2023-06-13 ENCOUNTER — OFFICE VISIT (OUTPATIENT)
Dept: OBSTETRICS AND GYNECOLOGY | Facility: CLINIC | Age: 63
End: 2023-06-13
Payer: COMMERCIAL

## 2023-06-13 ENCOUNTER — HOSPITAL ENCOUNTER (OUTPATIENT)
Dept: MAMMOGRAPHY | Facility: HOSPITAL | Age: 63
Discharge: HOME | End: 2023-06-13
Admitting: SURGERY
Payer: COMMERCIAL

## 2023-06-13 VITALS
DIASTOLIC BLOOD PRESSURE: 78 MMHG | SYSTOLIC BLOOD PRESSURE: 126 MMHG | HEIGHT: 63 IN | WEIGHT: 178.2 LBS | BODY MASS INDEX: 31.57 KG/M2

## 2023-06-13 DIAGNOSIS — N90.89 VULVAR LESION: ICD-10-CM

## 2023-06-13 DIAGNOSIS — N63.10 MASS OF RIGHT BREAST, UNSPECIFIED QUADRANT: ICD-10-CM

## 2023-06-13 DIAGNOSIS — T14.8XXA BLOOD BLISTER: Primary | ICD-10-CM

## 2023-06-13 PROCEDURE — 77066 DX MAMMO INCL CAD BI: CPT

## 2023-06-13 PROCEDURE — G0279 TOMOSYNTHESIS, MAMMO: HCPCS

## 2023-06-13 NOTE — PROGRESS NOTES
Subjective   Chief Complaint   Patient presents with   • Follow-up     3 week follow-up on vaginal lesion       Nadine Gilliland is a 63 y.o. year old  presenting to be seen for follow-up vulvar lesion.  Patient had been seen about 4 weeks ago with a small purplish lesion on the left upper labia majora.  She reported it had felt irritated somewhat and she had been scratching the area.  On exam it had appeared to be a small blood blister that was scabbing over.  Patient returns today for follow-up and reports that she has not felt any further irritation in the area.    Past Medical History:   Diagnosis Date   • Anemia    • Anxiety    • Cirrhosis    • Colon polyp 3 yrs ago   • Depression    • Diabetes mellitus    • Disease of thyroid gland    • Diverticulitis of colon 8 yrs ago   • Gout    • Gout    • History of transfusion    • Hyperlipidemia    • Hypertension    • Liver cirrhosis    • Osteoarthritis    • Urinary tract infection         Current Outpatient Medications:   •  allopurinol (ZYLOPRIM) 100 MG tablet, , Disp: , Rfl:   •  Banophen 25 MG capsule, , Disp: , Rfl:   •  cefpodoxime (VANTIN) 200 MG tablet, , Disp: , Rfl:   •  Estrogens Conjugated (Premarin) 0.625 MG/GM vaginal cream, 0.5 GRAMS INSERT VAGINALLY AT BEDTIME TWICE WEEKLY, Disp: 30 g, Rfl: 11  •  famotidine (PEPCID) 20 MG tablet, Take 1 tablet by mouth 2 (Two) Times a Day., Disp: , Rfl:   •  ferrous sulfate 325 (65 FE) MG tablet, Take 1 tablet by mouth Daily., Disp: , Rfl:   •  fluticasone (FLONASE) 50 MCG/ACT nasal spray, , Disp: , Rfl:   •  levocetirizine (XYZAL) 5 MG tablet, , Disp: , Rfl:   •  losartan (COZAAR) 50 MG tablet, , Disp: , Rfl:   •  metFORMIN ER (GLUCOPHAGE-XR) 500 MG 24 hr tablet, , Disp: , Rfl:   •  montelukast (SINGULAIR) 10 MG tablet, , Disp: , Rfl:   •  NASAL DECONGESTANT 30 MG tablet, , Disp: , Rfl: 0  •  omega-3 acid ethyl esters (LOVAZA) 1 g capsule, Take 4 capsules by mouth Daily., Disp: , Rfl: 0  •  ONE TOUCH ULTRA  "TEST test strip, , Disp: , Rfl: 3  •  Ozempic, 1 MG/DOSE, 4 MG/3ML solution pen-injector, , Disp: , Rfl:   •  sertraline (ZOLOFT) 25 MG tablet, , Disp: , Rfl:   •  Synthroid 88 MCG tablet, , Disp: , Rfl:   •  ursodiol (ACTIGALL) 300 MG capsule, Take 4 capsules by mouth Daily., Disp: , Rfl: 5  •  vitamin D (ERGOCALCIFEROL) 85772 units capsule capsule, , Disp: , Rfl:    Allergies   Allergen Reactions   • Atorvastatin Other (See Comments)     Patient advised unable to move arms or legs.    • Lipitor [Atorvastatin Calcium] Other (See Comments)     Patient advised unable to move arms or legs.    • Contrast Dye (Echo Or Unknown Ct/Mr) Hives   • Peanut-Containing Drug Products Swelling   • Ciprofloxacin Rash   • Nsaids Unknown (See Comments)   • Sulfa Antibiotics Unknown (See Comments)      Past Surgical History:   Procedure Laterality Date   • BILATERAL SALPINGO OOPHORECTOMY     • HYSTERECTOMY      LAVH   • PARATHYROIDECTOMY     • SPLENECTOMY        Social History     Socioeconomic History   • Marital status:    Tobacco Use   • Smoking status: Never     Passive exposure: Never   • Smokeless tobacco: Never   Vaping Use   • Vaping Use: Never used   Substance and Sexual Activity   • Alcohol use: No   • Drug use: No   • Sexual activity: Not Currently     Partners: Male     Birth control/protection: Surgical      Family History   Problem Relation Age of Onset   • Coronary artery disease Father    • Colon cancer Mother    • Hypertension Mother    • Stroke Maternal Grandmother    • Arthritis Daughter    • Depression Daughter    • Diabetes Sister        Review of Systems   Constitutional: Negative for chills, diaphoresis and fever.   Gastrointestinal: Negative for constipation, diarrhea, nausea and vomiting.   Genitourinary: Negative for difficulty urinating, dysuria, pelvic pain, vaginal bleeding and vaginal pain.           Objective   /78   Ht 160 cm (63\")   Wt 80.8 kg (178 lb 3.2 oz)   LMP  (LMP Unknown)   BMI " 31.57 kg/m²     Physical Exam  Exam conducted with a chaperone present.   Constitutional:       Appearance: Normal appearance. She is well-developed and well-groomed.   Eyes:      General: Lids are normal.      Extraocular Movements: Extraocular movements intact.      Conjunctiva/sclera: Conjunctivae normal.   Genitourinary:     Labia:         Right: No rash, tenderness, lesion or injury.         Left: No rash, tenderness, lesion or injury.       Urethra: No prolapse, urethral pain, urethral swelling or urethral lesion.      Comments: Previous small purple spot upper left labia majora has resolved  Neurological:      Mental Status: She is alert.   Psychiatric:         Attention and Perception: Attention normal.         Mood and Affect: Mood normal.         Speech: Speech normal.         Behavior: Behavior is cooperative.            Result Review :                   Assessment and Plan  Diagnoses and all orders for this visit:    1. Blood blister (Primary)    2. Vulvar lesion      Patient Instructions   Patient reassured lesion resolved  RTO prn             This note was electronically signed.    Claire Campbell PA-C   June 13, 2023

## 2023-08-21 ENCOUNTER — OFFICE VISIT (OUTPATIENT)
Dept: OBSTETRICS AND GYNECOLOGY | Facility: CLINIC | Age: 63
End: 2023-08-21
Payer: COMMERCIAL

## 2023-08-21 VITALS
DIASTOLIC BLOOD PRESSURE: 60 MMHG | SYSTOLIC BLOOD PRESSURE: 130 MMHG | HEIGHT: 63 IN | BODY MASS INDEX: 31.15 KG/M2 | WEIGHT: 175.8 LBS

## 2023-08-21 DIAGNOSIS — N76.0 ACUTE VAGINITIS: Primary | ICD-10-CM

## 2023-08-21 PROCEDURE — 99213 OFFICE O/P EST LOW 20 MIN: CPT | Performed by: PHYSICIAN ASSISTANT

## 2023-08-21 PROCEDURE — 1160F RVW MEDS BY RX/DR IN RCRD: CPT | Performed by: PHYSICIAN ASSISTANT

## 2023-08-21 PROCEDURE — 1159F MED LIST DOCD IN RCRD: CPT | Performed by: PHYSICIAN ASSISTANT

## 2023-08-21 RX ORDER — FLUCONAZOLE 150 MG/1
TABLET ORAL
Qty: 2 TABLET | Refills: 0 | Status: SHIPPED | OUTPATIENT
Start: 2023-08-21

## 2023-08-21 NOTE — PROGRESS NOTES
Subjective   Chief Complaint   Patient presents with    Vaginal Discharge     C/O vaginal irritation and discharge       Nadine Gilliland is a 63 y.o. year old  presenting to be seen for vaginal irritation, itching and discharge.  Symptoms started about 2 weeks ago  States discharge appeared yellow and white initially  Using Premarin cream twice weekly  Had recent HgA1c but has not received results yet     Past Medical History:   Diagnosis Date    Anemia     Anxiety     Cirrhosis     Colon polyp 3 yrs ago    Depression     Diabetes mellitus     Disease of thyroid gland     Diverticulitis of colon 8 yrs ago    Gout     Gout     History of transfusion     Hyperlipidemia     Hypertension     Liver cirrhosis     Multiple gestation     Osteoarthritis     Urinary tract infection         Current Outpatient Medications:     allopurinol (ZYLOPRIM) 100 MG tablet, , Disp: , Rfl:     Banophen 25 MG capsule, , Disp: , Rfl:     cefpodoxime (VANTIN) 200 MG tablet, , Disp: , Rfl:     Estrogens Conjugated (Premarin) 0.625 MG/GM vaginal cream, 0.5 GRAMS INSERT VAGINALLY AT BEDTIME TWICE WEEKLY, Disp: 30 g, Rfl: 11    famotidine (PEPCID) 20 MG tablet, Take 1 tablet by mouth 2 (Two) Times a Day., Disp: , Rfl:     ferrous sulfate 325 (65 FE) MG tablet, Take 1 tablet by mouth Daily., Disp: , Rfl:     fluticasone (FLONASE) 50 MCG/ACT nasal spray, , Disp: , Rfl:     levocetirizine (XYZAL) 5 MG tablet, , Disp: , Rfl:     losartan (COZAAR) 50 MG tablet, , Disp: , Rfl:     metFORMIN ER (GLUCOPHAGE-XR) 500 MG 24 hr tablet, , Disp: , Rfl:     montelukast (SINGULAIR) 10 MG tablet, , Disp: , Rfl:     NASAL DECONGESTANT 30 MG tablet, , Disp: , Rfl: 0    omega-3 acid ethyl esters (LOVAZA) 1 g capsule, Take 4 capsules by mouth Daily., Disp: , Rfl: 0    ONE TOUCH ULTRA TEST test strip, , Disp: , Rfl: 3    Ozempic, 1 MG/DOSE, 4 MG/3ML solution pen-injector, , Disp: , Rfl:     sertraline (ZOLOFT) 25 MG tablet, , Disp: , Rfl:     Synthroid 88  "MCG tablet, , Disp: , Rfl:     ursodiol (ACTIGALL) 300 MG capsule, Take 4 capsules by mouth Daily., Disp: , Rfl: 5    vitamin D (ERGOCALCIFEROL) 11138 units capsule capsule, , Disp: , Rfl:     fluconazole (Diflucan) 150 MG tablet, One po now and repeat in 3 days, Disp: 2 tablet, Rfl: 0   Allergies   Allergen Reactions    Atorvastatin Other (See Comments)     Patient advised unable to move arms or legs.     Lipitor [Atorvastatin Calcium] Other (See Comments)     Patient advised unable to move arms or legs.     Contrast Dye (Echo Or Unknown Ct/Mr) Hives    Peanut-Containing Drug Products Swelling    Ciprofloxacin Rash    Nsaids Unknown (See Comments)    Sulfa Antibiotics Unknown (See Comments)      Past Surgical History:   Procedure Laterality Date    BILATERAL SALPINGO OOPHORECTOMY      HYSTERECTOMY      LAVH    LAPAROSCOPIC ASSISTED VAGINAL HYSTERECTOMY SALPINGO OOPHORECTOMY      PARATHYROIDECTOMY      SPLENECTOMY        Social History     Socioeconomic History    Marital status:    Tobacco Use    Smoking status: Never     Passive exposure: Never    Smokeless tobacco: Never   Vaping Use    Vaping Use: Never used   Substance and Sexual Activity    Alcohol use: No    Drug use: No    Sexual activity: Not Currently     Partners: Male     Birth control/protection: Surgical, Hysterectomy      Family History   Problem Relation Age of Onset    Coronary artery disease Father     Colon cancer Mother     Hypertension Mother     Stroke Maternal Grandmother     Arthritis Daughter     Depression Daughter     Diabetes Sister        Review of Systems   Constitutional:  Negative for chills, diaphoresis and fever.   Gastrointestinal:  Negative for constipation, diarrhea, nausea and vomiting.   Genitourinary:  Positive for vaginal discharge. Negative for difficulty urinating, dysuria and vaginal bleeding.         Objective   /60   Ht 160 cm (63\")   Wt 79.7 kg (175 lb 12.8 oz)   LMP  (LMP Unknown)   BMI 31.14 kg/mý "     Physical Exam  Constitutional:       Appearance: Normal appearance. She is well-developed and well-groomed.   Eyes:      General: Lids are normal.      Extraocular Movements: Extraocular movements intact.      Conjunctiva/sclera: Conjunctivae normal.   Genitourinary:     Labia:         Right: No rash, tenderness or lesion.         Left: No rash, tenderness or lesion.       Urethra: No prolapse, urethral pain, urethral swelling or urethral lesion.      Vagina: No signs of injury. Vaginal discharge and erythema present. No tenderness, bleeding or lesions.      Comments: Thick creamy white discharge   Neurological:      General: No focal deficit present.      Mental Status: She is alert and oriented to person, place, and time.   Psychiatric:         Attention and Perception: Attention normal.         Mood and Affect: Mood normal.         Speech: Speech normal.         Behavior: Behavior is cooperative.          Result Review :                   Assessment and Plan  Diagnoses and all orders for this visit:    1. Acute vaginitis (Primary)  -     NuSwab VG+ - Swab, Vagina    Other orders  -     fluconazole (Diflucan) 150 MG tablet; One po now and repeat in 3 days  Dispense: 2 tablet; Refill: 0      Patient Instructions   Will contact with results of swab when available           This note was electronically signed.    Claire Campbell PA-C   August 21, 2023

## 2023-09-26 ENCOUNTER — OFFICE VISIT (OUTPATIENT)
Dept: UROLOGY | Facility: CLINIC | Age: 63
End: 2023-09-26
Payer: COMMERCIAL

## 2023-09-26 VITALS
BODY MASS INDEX: 31.01 KG/M2 | DIASTOLIC BLOOD PRESSURE: 76 MMHG | SYSTOLIC BLOOD PRESSURE: 118 MMHG | WEIGHT: 175 LBS | HEIGHT: 63 IN

## 2023-09-26 DIAGNOSIS — N30.10 INTERSTITIAL CYSTITIS: Primary | ICD-10-CM

## 2023-09-26 LAB
BILIRUB BLD-MCNC: NEGATIVE MG/DL
CLARITY, POC: CLEAR
COLOR UR: YELLOW
EXPIRATION DATE: ABNORMAL
GLUCOSE UR STRIP-MCNC: NEGATIVE MG/DL
KETONES UR QL: NEGATIVE
LEUKOCYTE EST, POC: ABNORMAL
Lab: ABNORMAL
NITRITE UR-MCNC: NEGATIVE MG/ML
PH UR: 6 [PH] (ref 5–8)
PROT UR STRIP-MCNC: NEGATIVE MG/DL
RBC # UR STRIP: NEGATIVE /UL
SP GR UR: 1 (ref 1–1.03)
UROBILINOGEN UR QL: NORMAL

## 2023-09-26 RX ORDER — CHOLESTYRAMINE 4 G/9G
POWDER, FOR SUSPENSION ORAL
COMMUNITY
Start: 2023-09-25

## 2023-09-26 RX ORDER — METHENAMINE, SODIUM PHOSPHATE, MONOBASIC, MONOHYDRATE, PHENYL SALICYLATE, METHYLENE BLUE, AND HYOSCYAMINE SULFATE 118; 40.8; 36; 10; .12 MG/1; MG/1; MG/1; MG/1; MG/1
1 CAPSULE ORAL 4 TIMES DAILY PRN
Qty: 30 CAPSULE | Refills: 1 | Status: SHIPPED | OUTPATIENT
Start: 2023-09-26

## 2023-09-26 RX ORDER — AMITRIPTYLINE HYDROCHLORIDE 10 MG/1
TABLET, FILM COATED ORAL
Qty: 30 TABLET | Refills: 3 | Status: SHIPPED | OUTPATIENT
Start: 2023-09-26

## 2023-09-26 NOTE — PROGRESS NOTES
Office Visit General Female New      Patient Name: Nadine Gilliland  : 1960   MRN: [id number]     Chief Complaint:   Chief Complaint   Patient presents with    interstitial cystitis       Referring Provider: Jolene Gomez AP*    History of Present Illness: Ms. Gilliland is a 63 y.o. female with below past medical history who presents with history of IC with no flares for many years.  Saw Dr. Nunez many years ago and was told she had fluid filled polyps on the bladder when they did a cystoscopy, she did have monthly bladder cocktails in the 's  If she drinks a lot of tea she will have more symptoms  Current symptoms         Subjective      Review of System:   As noted in HPI    Past Medical History:   Past Medical History:   Diagnosis Date    Anemia     Anxiety     Cirrhosis     Colon polyp 3 yrs ago    Depression     Diabetes mellitus     Disease of thyroid gland     Diverticulitis of colon 8 yrs ago    Gout     Gout     History of transfusion     Hyperlipidemia     Hypertension     Liver cirrhosis     Multiple gestation     Osteoarthritis     Urinary tract infection        Past Surgical History:   Past Surgical History:   Procedure Laterality Date    BILATERAL SALPINGO OOPHORECTOMY      HYSTERECTOMY      LAVH    LAPAROSCOPIC ASSISTED VAGINAL HYSTERECTOMY SALPINGO OOPHORECTOMY      PARATHYROIDECTOMY      SPLENECTOMY         Family History:   Family History   Problem Relation Age of Onset    Coronary artery disease Father     Colon cancer Mother     Hypertension Mother     Stroke Maternal Grandmother     Arthritis Daughter     Depression Daughter     Diabetes Sister        Social History:   Social History     Socioeconomic History    Marital status:    Tobacco Use    Smoking status: Never     Passive exposure: Never    Smokeless tobacco: Never   Vaping Use    Vaping Use: Never used   Substance and Sexual Activity    Alcohol use: No    Drug use: No    Sexual activity: Not Currently      Partners: Male     Birth control/protection: Surgical, Hysterectomy       Medications:     Current Outpatient Medications:     cholestyramine (QUESTRAN) 4 GM/DOSE powder, , Disp: , Rfl:     allopurinol (ZYLOPRIM) 100 MG tablet, , Disp: , Rfl:     amitriptyline (ELAVIL) 10 MG tablet, Take 1/2 tablet nightly and can increase to 1 tablet nightly if needed, Disp: 30 tablet, Rfl: 3    Banophen 25 MG capsule, , Disp: , Rfl:     cefpodoxime (VANTIN) 200 MG tablet, , Disp: , Rfl:     Estrogens Conjugated (Premarin) 0.625 MG/GM vaginal cream, 0.5 GRAMS INSERT VAGINALLY AT BEDTIME TWICE WEEKLY, Disp: 30 g, Rfl: 11    famotidine (PEPCID) 20 MG tablet, Take 1 tablet by mouth 2 (Two) Times a Day., Disp: , Rfl:     ferrous sulfate 325 (65 FE) MG tablet, Take 1 tablet by mouth Daily., Disp: , Rfl:     fluconazole (Diflucan) 150 MG tablet, One po now and repeat in 3 days, Disp: 2 tablet, Rfl: 0    fluticasone (FLONASE) 50 MCG/ACT nasal spray, , Disp: , Rfl:     levocetirizine (XYZAL) 5 MG tablet, , Disp: , Rfl:     losartan (COZAAR) 50 MG tablet, , Disp: , Rfl:     metFORMIN ER (GLUCOPHAGE-XR) 500 MG 24 hr tablet, , Disp: , Rfl:     montelukast (SINGULAIR) 10 MG tablet, , Disp: , Rfl:     NASAL DECONGESTANT 30 MG tablet, , Disp: , Rfl: 0    omega-3 acid ethyl esters (LOVAZA) 1 g capsule, Take 4 capsules by mouth Daily., Disp: , Rfl: 0    ONE TOUCH ULTRA TEST test strip, , Disp: , Rfl: 3    Ozempic, 1 MG/DOSE, 4 MG/3ML solution pen-injector, , Disp: , Rfl:     sertraline (ZOLOFT) 25 MG tablet, , Disp: , Rfl:     Synthroid 88 MCG tablet, , Disp: , Rfl:     uribel (URO-MP) 118 MG capsule capsule, Take 1 capsule by mouth 4 (Four) Times a Day As Needed (bladder pain)., Disp: 30 capsule, Rfl: 1    ursodiol (ACTIGALL) 300 MG capsule, Take 4 capsules by mouth Daily., Disp: , Rfl: 5    vitamin D (ERGOCALCIFEROL) 78660 units capsule capsule, , Disp: , Rfl:     Allergies:   Allergies   Allergen Reactions    Atorvastatin Other (See Comments)  "    Patient advised unable to move arms or legs.     Lipitor [Atorvastatin Calcium] Other (See Comments)     Patient advised unable to move arms or legs.     Contrast Dye (Echo Or Unknown Ct/Mr) Hives    Peanut-Containing Drug Products Swelling    Ciprofloxacin Rash    Nsaids Unknown (See Comments)    Sulfa Antibiotics Unknown (See Comments)       Objective     Physical Exam:   Vital Signs:   Vitals:    09/26/23 1455   BP: 118/76   BP Location: Right arm   Patient Position: Sitting   Cuff Size: Adult   Weight: 79.4 kg (175 lb)   Height: 160 cm (62.99\")     Body mass index is 31.01 kg/m².     Constitutional: NAD, WDWN.   Neurological: A + O x 3  Psychiatric:  Normal mood and affect    Labs  Brief Urine Lab Results  (Last result in the past 365 days)        Color   Clarity   Blood   Leuk Est   Nitrite   Protein   CREAT   Urine HCG        09/26/23 1459 Yellow   Clear   Negative   Trace   Negative   Negative                        Lab Results   Component Value Date    EGFRIFAFRI 83 09/25/2023       No results found for: WBC, HGB, HCT, MCV, PLT    Images:   Mammo Diagnostic Digital Tomosynthesis Bilateral With CAD    Result Date: 6/13/2023  BI-RADS CATEGORY: 1 , NEGATIVE.  RECOMMENDED FOLLOW-UP: 12M  FOLLOW UP SCREENING MAMMOGRAM IN ONE YEAR. Nothing seen by mammogram to correlate with the palpable abnormality in right axilla; patient has already had an ultrasound and will follow up with Dr. Zuniga 08/09/2023.    NOTES: Mammography does not detect approximately 10-15% of breast cancers. Physical examination of the breasts by a physician and regular monthly breast self examinations are integral parts of breast cancer screening.  A normal mammogram does not exclude breast cancer if there is an abnormal finding on physical examination. When clinically indicated, a biopsy should not be postponed because of a normal mammogram report.  The images are stored at [facility], [place]. [address]  NOTE: If a biopsy is " performed on this patient, a copy of the pathology report would be appreciated.  This report was signed and finalized on 6/13/2023 2:06 PM by Estrellita Dixon MD.     US Axilla Right    Result Date: 7/13/2023  4.7 cm mass in the right axilla most likely lipoma       Assessment / Plan      .Assessment  Ms. Gilliland is a 63 y.o. female with longstanding history of IC diagnosed back in the 1990s went for many years with no complications and has started having IC flares.  Discussed treatment options including oral medications, rescue meds for dysuria and bladder cocktails.  She wishes to do a trial of oral medications we will start amitriptyline and send Uribel to take as needed for dysuria.  She reports having a cystoscopy in the 90s with Dr. Nunez and was told that she had polyps in her bladder we discussed I would recommend that she have this reevaluated with no medical record I am not clear what this means for her as she was not treated for anything other than IC and I recommend cystoscopy to rule out the possibility of Hunner lesions      Plan  1.  Cystoscopy with Dr. Kumar next visit  2.  Continue topical vaginal estradiol  3.  Start amitriptyline 5 mg nightly  4.  Uribel as needed for rescue pain    Follow Up:   Return for Dr. Kumar cystoscopy.    SUSY Whaley, NP-C  [facility]

## 2023-11-16 ENCOUNTER — PROCEDURE VISIT (OUTPATIENT)
Dept: UROLOGY | Facility: CLINIC | Age: 63
End: 2023-11-16
Payer: COMMERCIAL

## 2023-11-16 DIAGNOSIS — N30.10 INTERSTITIAL CYSTITIS: Primary | ICD-10-CM

## 2023-11-16 NOTE — PROGRESS NOTES
Preprocedure diagnosis  Interstitial cystitis    Postprocedure diagnosis  No Hunner's ulcers or tumors    Procedure  Flexible Cystourethroscopy    Attending surgeon  Harjit Kumar MD    Anesthesia  2% lidocaine jelly intraurethrally    Complications  None    Indications  63 y.o. female undergoing a flexible cystoscopy for the above mentioned indications.    Informed consent was obtained.      Findings  Cystoscopy revealed one right and left ureteral orifice in the normal anatomic position, normal bladder mucosa and no tumors, masses or stones.      Procedure  The patient was placed in supine position and prepped and draped in sterile fashion with lidocaine jelly per urethra for anesthesia.  A timeout was performed.  The 14F flexible cystoscope was lubricated and gently placed through the urethra and into the bladder.  The bladder was completely visualized.  The cystoscope was retroflexed and the bladder neck visualized.  The scope was withdrawn and the procedure terminated.  The patient tolerated the procedure well.      She will continue the amitriptyline and periurethral Estrace and follow-up with Poppy in a year

## 2024-03-26 ENCOUNTER — OFFICE VISIT (OUTPATIENT)
Dept: OBSTETRICS AND GYNECOLOGY | Facility: CLINIC | Age: 64
End: 2024-03-26
Payer: COMMERCIAL

## 2024-03-26 VITALS
SYSTOLIC BLOOD PRESSURE: 112 MMHG | HEIGHT: 63 IN | BODY MASS INDEX: 31.54 KG/M2 | WEIGHT: 178 LBS | DIASTOLIC BLOOD PRESSURE: 70 MMHG

## 2024-03-26 DIAGNOSIS — Z01.419 ENCOUNTER FOR GYNECOLOGICAL EXAMINATION WITHOUT ABNORMAL FINDING: Primary | ICD-10-CM

## 2024-03-26 DIAGNOSIS — Z12.31 ENCOUNTER FOR SCREENING MAMMOGRAM FOR BREAST CANCER: ICD-10-CM

## 2024-03-26 DIAGNOSIS — N95.2 POSTMENOPAUSAL ATROPHIC VAGINITIS: ICD-10-CM

## 2024-03-26 DIAGNOSIS — Z12.4 SCREENING FOR MALIGNANT NEOPLASM OF CERVIX: ICD-10-CM

## 2024-03-26 PROCEDURE — 1159F MED LIST DOCD IN RCRD: CPT | Performed by: MIDWIFE

## 2024-03-26 PROCEDURE — 99396 PREV VISIT EST AGE 40-64: CPT | Performed by: MIDWIFE

## 2024-03-26 PROCEDURE — 1160F RVW MEDS BY RX/DR IN RCRD: CPT | Performed by: MIDWIFE

## 2024-03-26 RX ORDER — CONJUGATED ESTROGENS 0.62 MG/G
CREAM VAGINAL
Qty: 30 G | Refills: 11 | Status: SHIPPED | OUTPATIENT
Start: 2024-03-26

## 2024-03-26 NOTE — PROGRESS NOTES
"Subjective   Chief Complaint   Patient presents with    Gynecologic Exam     No Complaints/concerns      Nadine Gilliland is a 64 y.o. year old  presenting to be seen for her annual exam.   Mammogram is up to date.  She is doing well with Premarin cream and wants to continue.  She wants to be checked for UTI due to frequency.    Past Medical History:   Diagnosis Date    Anemia     Anxiety     Cirrhosis     Colon polyp 3 yrs ago    Depression     Diabetes mellitus     Disease of thyroid gland     Diverticulitis of colon 8 yrs ago    Gout     Gout     History of transfusion     Hyperlipidemia     Hypertension     Liver cirrhosis     Multiple gestation     Osteoarthritis     Urinary tract infection       Past Surgical History:   Procedure Laterality Date    BILATERAL SALPINGO OOPHORECTOMY      HYSTERECTOMY      LAVH    LAPAROSCOPIC ASSISTED VAGINAL HYSTERECTOMY SALPINGO OOPHORECTOMY      PARATHYROIDECTOMY      SPLENECTOMY            The following portions of the patient's history were reviewed and updated as appropriate:problem list, current medications, allergies, past family history, past medical history, past social history and past surgical history.    Review of Systems   Constitutional: Negative.    Respiratory: Negative.     Cardiovascular: Negative.    Gastrointestinal: Negative.    Musculoskeletal: Negative.    Neurological: Negative.    Psychiatric/Behavioral: Negative.     All other systems reviewed and are negative.          Objective   /70   Ht 160 cm (63\")   Wt 80.7 kg (178 lb)   LMP  (LMP Unknown)   BMI 31.53 kg/m²     Physical Exam   Constitutional   The patient is awake, alert, well developed, well nourished and well groomed.   Neck   The neck is supple and the trachea is midline. The thyroid is not enlarged and there are no palpable nodules.   Breast  Inspection of the breast reveals symmetrical and smooth breast bilaterally without skin color changes, lesions, dimpling or skin " retraction.  The nipples are  everted and without discharge.  Palpation of the breast tissue is non-tender, smooth, without masses.  The axillae are without masses.   Respiratory  The patient is relaxed and breathes without effort. Lungs CTAB  Cardiovascular  Heart regular rate and rhythm without murmur.  Gastrointestinal   The abdomen is soft and non-tender.  No hepatosplenomegaly.  Genitourinary   - External Genitalia without erythema, lesions, or masses  -Urethral Meatus is without erythema, edema, prolapse or lesions.   -Bladder - Palpation at the region above the symphysis pubis             reveals no bladder tenderness.   -Vagina - There is no excessive vaginal discharge.   Vaginal walls reveals moist vaginal mucosa without inflammation or lesions    There is no evidence of pelvic relaxation; there is no cystocele or rectocele.  -Cervix  is absent   Uterus - absent  Adnexa structures are absent  Perineum is without inflammation or lesions   Extremities  Full ROM. No cyanosis or edema   Skin  Normal in color, texture, moisture, temperature, mobility and turgor. There are no abnormal lesions.    Psychiatric  The patient is oriented to person, place, and time. Speech is fluent and words are clear          Assessment /Plan      Diagnoses and all orders for this visit:    1. Encounter for gynecological examination without abnormal finding (Primary)  Encouraged healthy eating and exercise  2. Screening for malignant neoplasm of cervix   I explained to Nadine that the Pap smears are no longer recommended in patients after hysterectomy unless the patient has a history of SMITHA 2 or higher in the past 20 years or have a history of cervical cancer.    I stressed to Nadine that she still should be seen to be seen yearly for a full physical including breast and pelvic exam. In women with no risk factors as noted, cytology screening has a small chance of detecting an abnormality and primary vaginal cancer is a rare gynecologic  malignancy.  For this reason, Nadine has declined pap smear screening this year.  3. Encounter for screening mammogram for breast cancer  -     Mammo Diagnostic Digital Tomosynthesis Bilateral With CAD  Encouraged self breast exam  4. Postmenopausal atrophic vaginitis  -     Estrogens Conjugated (Premarin) 0.625 MG/GM vaginal cream; 0.5 GRAMS INSERT VAGINALLY AT BEDTIME TWICE WEEKLY  Dispense: 30 g; Refill: 11                   This note was electronically signed.    Sienna Montanez CNM   March 26, 2024

## 2024-07-11 ENCOUNTER — HOSPITAL ENCOUNTER (OUTPATIENT)
Dept: MAMMOGRAPHY | Facility: HOSPITAL | Age: 64
Discharge: HOME | End: 2024-07-11
Admitting: MIDWIFE
Payer: COMMERCIAL

## 2024-07-11 PROCEDURE — 77063 BREAST TOMOSYNTHESIS BI: CPT

## 2024-07-11 PROCEDURE — 77067 SCR MAMMO BI INCL CAD: CPT

## 2024-11-08 ENCOUNTER — TELEPHONE (OUTPATIENT)
Dept: UROLOGY | Facility: CLINIC | Age: 64
End: 2024-11-08
Payer: COMMERCIAL

## 2024-11-08 NOTE — TELEPHONE ENCOUNTER
Left  for patient informing her that I had to change her appointment time from 1030 to 1020    C.Choco,HECTOR

## 2024-11-15 ENCOUNTER — OFFICE VISIT (OUTPATIENT)
Dept: OBSTETRICS AND GYNECOLOGY | Facility: CLINIC | Age: 64
End: 2024-11-15
Payer: COMMERCIAL

## 2024-11-15 VITALS
SYSTOLIC BLOOD PRESSURE: 124 MMHG | HEIGHT: 63 IN | BODY MASS INDEX: 31.54 KG/M2 | WEIGHT: 178 LBS | DIASTOLIC BLOOD PRESSURE: 74 MMHG

## 2024-11-15 DIAGNOSIS — N89.8 VAGINAL IRRITATION: ICD-10-CM

## 2024-11-15 DIAGNOSIS — N90.4 LICHEN SCLEROSUS OF FEMALE GENITALIA: Primary | ICD-10-CM

## 2024-11-15 DIAGNOSIS — N90.89 VULVAR IRRITATION: ICD-10-CM

## 2024-11-15 DIAGNOSIS — R30.0 DYSURIA: ICD-10-CM

## 2024-11-15 PROCEDURE — 99214 OFFICE O/P EST MOD 30 MIN: CPT | Performed by: OBSTETRICS & GYNECOLOGY

## 2024-11-15 RX ORDER — FLUCONAZOLE 150 MG/1
150 TABLET ORAL DAILY
Qty: 1 TABLET | Refills: 0 | Status: SHIPPED | OUTPATIENT
Start: 2024-11-15

## 2024-11-15 RX ORDER — CLOBETASOL PROPIONATE 0.5 MG/G
OINTMENT TOPICAL
Qty: 60 G | Refills: 6 | Status: SHIPPED | OUTPATIENT
Start: 2024-11-15

## 2024-11-19 LAB
A VAGINAE DNA VAG QL NAA+PROBE: NORMAL SCORE
APPEARANCE UR: CLEAR
BACTERIA #/AREA URNS HPF: ABNORMAL /[HPF]
BACTERIA UR CULT: NORMAL
BACTERIA UR CULT: NORMAL
BILIRUB UR QL STRIP: NEGATIVE
BVAB2 DNA VAG QL NAA+PROBE: NORMAL SCORE
C ALBICANS DNA VAG QL NAA+PROBE: NEGATIVE
C GLABRATA DNA VAG QL NAA+PROBE: NEGATIVE
C TRACH DNA SPEC QL NAA+PROBE: NEGATIVE
CASTS URNS QL MICRO: ABNORMAL /LPF
COLOR UR: YELLOW
EPI CELLS #/AREA URNS HPF: >10 /HPF (ref 0–10)
GLUCOSE UR QL STRIP: NEGATIVE
HGB UR QL STRIP: NEGATIVE
KETONES UR QL STRIP: NEGATIVE
LEUKOCYTE ESTERASE UR QL STRIP: ABNORMAL
MEGA1 DNA VAG QL NAA+PROBE: NORMAL SCORE
MICRO URNS: ABNORMAL
N GONORRHOEA DNA VAG QL NAA+PROBE: NEGATIVE
NITRITE UR QL STRIP: NEGATIVE
PH UR STRIP: 7 [PH] (ref 5–7.5)
PROT UR QL STRIP: NEGATIVE
RBC #/AREA URNS HPF: ABNORMAL /HPF (ref 0–2)
SP GR UR STRIP: 1.02 (ref 1–1.03)
T VAGINALIS DNA VAG QL NAA+PROBE: NEGATIVE
URINALYSIS REFLEX: ABNORMAL
UROBILINOGEN UR STRIP-MCNC: 0.2 MG/DL (ref 0.2–1)
WBC #/AREA URNS HPF: ABNORMAL /HPF (ref 0–5)

## 2024-11-25 ENCOUNTER — OFFICE VISIT (OUTPATIENT)
Dept: UROLOGY | Facility: CLINIC | Age: 64
End: 2024-11-25
Payer: COMMERCIAL

## 2024-11-25 VITALS
HEIGHT: 63 IN | SYSTOLIC BLOOD PRESSURE: 118 MMHG | BODY MASS INDEX: 31.54 KG/M2 | DIASTOLIC BLOOD PRESSURE: 68 MMHG | TEMPERATURE: 97.1 F | WEIGHT: 178 LBS | HEART RATE: 68 BPM | OXYGEN SATURATION: 98 %

## 2024-11-25 DIAGNOSIS — N30.10 INTERSTITIAL CYSTITIS: Primary | ICD-10-CM

## 2024-11-25 PROBLEM — E66.9 OBESITY (BMI 30-39.9): Status: ACTIVE | Noted: 2022-03-15

## 2024-11-25 LAB
BILIRUB BLD-MCNC: NEGATIVE MG/DL
CLARITY, POC: CLEAR
COLOR UR: YELLOW
EXPIRATION DATE: ABNORMAL
GLUCOSE UR STRIP-MCNC: NEGATIVE MG/DL
KETONES UR QL: NEGATIVE
LEUKOCYTE EST, POC: ABNORMAL
Lab: ABNORMAL
NITRITE UR-MCNC: NEGATIVE MG/ML
PH UR: 6 [PH] (ref 5–8)
PROT UR STRIP-MCNC: NEGATIVE MG/DL
RBC # UR STRIP: NEGATIVE /UL
SP GR UR: 1.02 (ref 1–1.03)
UROBILINOGEN UR QL: NORMAL

## 2024-11-25 RX ORDER — LANCETS 30 GAUGE
EACH MISCELLANEOUS
COMMUNITY
Start: 2024-07-31

## 2024-11-25 RX ORDER — SEMAGLUTIDE 0.68 MG/ML
INJECTION, SOLUTION SUBCUTANEOUS
COMMUNITY
Start: 2024-11-20

## 2024-11-25 RX ORDER — AMITRIPTYLINE HYDROCHLORIDE 10 MG/1
TABLET ORAL
Qty: 30 TABLET | Refills: 6 | Status: SHIPPED | OUTPATIENT
Start: 2024-11-25

## 2024-11-25 RX ORDER — AZELASTINE HYDROCHLORIDE 137 UG/1
SPRAY, METERED NASAL
COMMUNITY
Start: 2024-11-06

## 2024-11-25 RX ORDER — FAMOTIDINE 40 MG/1
TABLET, FILM COATED ORAL
COMMUNITY
Start: 2024-11-20 | End: 2024-11-25 | Stop reason: ALTCHOICE

## 2024-11-25 RX ORDER — PREDNISONE 10 MG/1
TABLET ORAL
COMMUNITY
Start: 2024-08-21

## 2024-11-25 RX ORDER — SEMAGLUTIDE 1.34 MG/ML
1 INJECTION, SOLUTION SUBCUTANEOUS
COMMUNITY
Start: 2024-09-27

## 2024-11-25 RX ORDER — EPINEPHRINE 0.3 MG/.3ML
INJECTION SUBCUTANEOUS
COMMUNITY
Start: 2024-08-21

## 2024-11-25 RX ORDER — VALACYCLOVIR HYDROCHLORIDE 1 G/1
TABLET, FILM COATED ORAL
COMMUNITY
Start: 2024-08-21

## 2024-11-25 RX ORDER — METHENAMINE, SODIUM PHOSPHATE, MONOBASIC, ANHYDROUS, PHENYL SALICYLATE, METHYLENE BLUE AND HYOSCYAMINE SULFATE 118; 40.8; 36; 10; .12 MG/1; MG/1; MG/1; MG/1; MG/1
1 CAPSULE ORAL 2 TIMES DAILY PRN
Qty: 30 CAPSULE | Refills: 6 | Status: SHIPPED | OUTPATIENT
Start: 2024-11-25

## 2024-11-25 RX ORDER — CICLOPIROX 80 MG/ML
SOLUTION TOPICAL
COMMUNITY
Start: 2024-09-12

## 2024-11-25 NOTE — PROGRESS NOTES
Office Visit General Established Female Patient     Patient Name: Nadine Gilliland  : 1960   MRN: [id number]     Chief Complaint:   Chief Complaint   Patient presents with    Follow-up     I year with Poppy KEARNEY        Referring Provider: No ref. provider found    History of Present Illness: Nadine Gilliland is a 64 y.o. female with history below in assessment, who presents for follow up.  Using  amitriptylene and Uribel as needed  No significant flares in the last year  Uses vaginal estrogen cream 2 times daily and steroid cream for Lichen sclerosis gets refilled by gynecology      Subjective      Review of System:   As noted in HPI     Past Medical History:   Past Medical History:   Diagnosis Date    Anemia     Anxiety     Cirrhosis     Colon polyp 3 yrs ago    Depression     Diabetes mellitus     Disease of thyroid gland     Diverticulitis of colon 8 yrs ago    Gout     Gout     History of transfusion     Hyperlipidemia     Hypertension     Liver cirrhosis     Multiple gestation     Osteoarthritis     Urinary tract infection        Past Surgical History:   Past Surgical History:   Procedure Laterality Date    BILATERAL SALPINGO OOPHORECTOMY      HYSTERECTOMY      LAVH    LAPAROSCOPIC ASSISTED VAGINAL HYSTERECTOMY SALPINGO OOPHORECTOMY      PARATHYROIDECTOMY      SPLENECTOMY         Family History:   Family History   Problem Relation Age of Onset    Colon cancer Mother     Hypertension Mother     Coronary artery disease Father     Diabetes Sister     Arthritis Daughter     Depression Daughter     Stroke Maternal Grandmother     Breast cancer Neg Hx        Social History:   Social History     Socioeconomic History    Marital status:    Tobacco Use    Smoking status: Never     Passive exposure: Never    Smokeless tobacco: Never   Vaping Use    Vaping status: Never Used   Substance and Sexual Activity    Alcohol use: No    Drug use: No    Sexual activity: Not Currently     Partners: Male      Birth control/protection: Surgical, Hysterectomy       Medications:     Current Outpatient Medications:     allopurinol (ZYLOPRIM) 100 MG tablet, , Disp: , Rfl:     amitriptyline (ELAVIL) 10 MG tablet, Take 1/2 tablet nightly and can increase to 1 tablet nightly if needed, Disp: 30 tablet, Rfl: 6    Azelastine HCl 137 MCG/SPRAY solution, , Disp: , Rfl:     Banophen 25 MG capsule, , Disp: , Rfl:     Cholecalciferol 50 MCG (2000 UT) capsule, Take 1 capsule by mouth., Disp: , Rfl:     cholestyramine (QUESTRAN) 4 GM/DOSE powder, , Disp: , Rfl:     ciclopirox (PENLAC) 8 % solution, , Disp: , Rfl:     clobetasol (TEMOVATE) 0.05 % ointment, Apply a dime-sized amount to affected area nightly for 6 weeks, then 1-3 times per week thereafter, Disp: 60 g, Rfl: 6    cyanocobalamin (VITAMIN B-12) 50 MCG tablet, Take 1 tablet by mouth Daily., Disp: , Rfl:     EPINEPHrine (EPIPEN) 0.3 MG/0.3ML solution auto-injector injection, , Disp: , Rfl:     Estrogens Conjugated (Premarin) 0.625 MG/GM vaginal cream, 0.5 GRAMS INSERT VAGINALLY AT BEDTIME TWICE WEEKLY, Disp: 30 g, Rfl: 11    famotidine (PEPCID) 40 MG tablet, , Disp: , Rfl:     ferrous sulfate 325 (65 FE) MG tablet, Take 1 tablet by mouth Daily., Disp: , Rfl:     fluconazole (Diflucan) 150 MG tablet, Take 1 tablet by mouth Daily., Disp: 1 tablet, Rfl: 0    fluticasone (FLONASE) 50 MCG/ACT nasal spray, , Disp: , Rfl:     Lancets (OneTouch Delica Plus [id number]) misc, , Disp: , Rfl:     levocetirizine (XYZAL) 5 MG tablet, , Disp: , Rfl:     losartan (COZAAR) 50 MG tablet, , Disp: , Rfl:     metFORMIN ER (GLUCOPHAGE-XR) 500 MG 24 hr tablet, , Disp: , Rfl:     Meth-Hyo-M Bl-Na Phos-Ph Sal (Uro-MP) 118 MG capsule, Take 1 capsule by mouth 2 (Two) Times a Day As Needed (bladder pain)., Disp: 30 capsule, Rfl: 6    montelukast (SINGULAIR) 10 MG tablet, , Disp: , Rfl:     NASAL DECONGESTANT 30 MG tablet, , Disp: , Rfl: 0    omega-3 acid ethyl esters (LOVAZA) 1 g capsule, Take 4 capsules by  "mouth Daily., Disp: , Rfl: 0    ONE TOUCH ULTRA TEST test strip, , Disp: , Rfl: 3    Ozempic, 0.25 or 0.5 MG/DOSE, 2 MG/3ML solution pen-injector, , Disp: , Rfl:     predniSONE (DELTASONE) 10 MG tablet, , Disp: , Rfl:     Semaglutide, 1 MG/DOSE, (Ozempic, 1 MG/DOSE,) 4 MG/3ML solution pen-injector, Inject 1 dose under the skin into the appropriate area as directed., Disp: , Rfl:     sertraline (ZOLOFT) 50 MG tablet, , Disp: , Rfl:     Synthroid 88 MCG tablet, , Disp: , Rfl:     ursodiol (ACTIGALL) 300 MG capsule, Take 4 capsules by mouth Daily., Disp: , Rfl: 5    valACYclovir (VALTREX) 1000 MG tablet, , Disp: , Rfl:     vitamin D (ERGOCALCIFEROL) 81984 units capsule capsule, , Disp: , Rfl:     famotidine (PEPCID) 20 MG tablet, Take 1 tablet by mouth 2 (Two) Times a Day. (Patient not taking: Reported on 11/25/2024), Disp: , Rfl:     Ozempic, 1 MG/DOSE, 4 MG/3ML solution pen-injector, , Disp: , Rfl:     sertraline (ZOLOFT) 25 MG tablet, , Disp: , Rfl:     Allergies:   Allergies   Allergen Reactions    Atorvastatin Other (See Comments)     Patient advised unable to move arms or legs.     Lipitor [Atorvastatin Calcium] Other (See Comments)     Patient advised unable to move arms or legs.     Contrast Dye (Echo Or Unknown Ct/Mr) Hives    Peanut-Containing Drug Products Swelling    Ciprofloxacin Rash    Nsaids Unknown (See Comments)    Sulfa Antibiotics Unknown (See Comments)       Objective     Physical Exam:   Vital Signs:   Visit Vitals  /68 (BP Location: Left arm, Patient Position: Sitting, Cuff Size: Adult)   Pulse 68   Temp 97.1 °F (36.2 °C) (Temporal)   Ht 160 cm (62.99\")   Wt 80.7 kg (178 lb)   LMP  (LMP Unknown)   SpO2 98%   BMI 31.54 kg/m²      Body mass index is 31.54 kg/m².     Physical Exam  Vitals and nursing note reviewed.   Constitutional:       General: She is not in acute distress.     Appearance: Normal appearance. She is obese. She is not ill-appearing.   Pulmonary:      Effort: Pulmonary effort " is normal. No respiratory distress.   Skin:     General: Skin is warm and dry.   Neurological:      General: No focal deficit present.      Mental Status: She is alert and oriented to person, place, and time.   Psychiatric:         Mood and Affect: Mood normal.         Behavior: Behavior normal.          Labs  Brief Urine Lab Results  (Last result in the past 365 days)        Color   Clarity   Blood   Leuk Est   Nitrite   Protein   CREAT   Urine HCG        11/25/24 1049 Yellow   Clear   Negative   Trace   Negative   Negative                   Lab Results   Component Value Date    EGFRIFAFRI 83 09/25/2023       Lab Results   Component Value Date    WBC 9.07 09/27/2024    HGB 9.6 (L) 09/27/2024    HCT 32.8 (L) 09/27/2024    MCV 85 09/27/2024     (H) 09/27/2024       Urine Culture          11/15/2024    10:03   Urine Culture   Urine Culture Final report         Radiographic Studies  US Liver    Result Date: 9/27/2024  1. Liver is mildly coarse and heterogeneous consistent with mild parenchymal disease. No sonographically detectable hepatic lesion 2. Absent spleen. No ascites. Category Score US-1 Negative. No US evidence of HCC. No observation or Only definitely benign observation(s). Continue with regular screening. Visualization Score Vis B. Moderate limitations. Limitations may obscure small masses. The above scoring system and recommendations are based on Ultrasound LI-RADS version 2017. [email or web address]/-/[email or web address] CRITICAL RESULT: No. COMMUNICATION: Per this written report. By electronically signing this report, I, the attending physician, attest that I have personally reviewed the images/data for the above examination(s) and agree with the final edited report. Drafted by Avelina Chaney MD on 9/27/2024 1:10 PM Final report signed by Sydnee Bustillo MD on 9/27/2024 8:46 PM      I have reviewed the above labs and imaging.     Assessment /  Plan      Assessment/Plan:   Diagnoses and all orders for this visit:    1. Interstitial cystitis (Primary)  -     POC Urinalysis Dipstick, Automated  -     Meth-Hyo-M Bl-Na Phos-Ph Sal (Uro-MP) 118 MG capsule; Take 1 capsule by mouth 2 (Two) Times a Day As Needed (bladder pain).  Dispense: 30 capsule; Refill: 6  -     amitriptyline (ELAVIL) 10 MG tablet; Take 1/2 tablet nightly and can increase to 1 tablet nightly if needed  Dispense: 30 tablet; Refill: 6    64-year-old female here to follow-up for interstitial cystitis.  She is managing well with amitriptyline 5 to 10 mg as needed and Uribel as needed for bladder pain.  She does continue using topical vaginal estradiol and steroid cream for lichen sclerosus this is managed by gynecology.  We discussed following up for bladder cocktails as needed and refilled amitriptyline and Uribel.    Continue amitriptyline 1/2 tablet to 1 tablet as needed for bladder pain  Continue Uribel as needed for bladder pain  Bladder cocktails for IC flares    Follow Up:   Return in about 1 year (around 11/25/2025) for Follow up Poppy.    SUSY Whaley, NP-C  [facility]

## 2024-11-28 PROBLEM — N90.4 LICHEN SCLEROSUS OF FEMALE GENITALIA: Status: ACTIVE | Noted: 2024-11-28

## 2024-11-28 NOTE — PROGRESS NOTES
GYN Office Visit    Subjective   Chief Complaint   Patient presents with    Bacterial vaginosis      BV symptoms - possible    UTI      Nadine Gilliland is a 64 y.o. year old  presenting for vaginal, vulvar and urinary symptoms.    She has both vaginal and vulvar discomfort/itching.  She has dysuria.    OB Hx:  x 3  Previous hysterectomy  Mammogram:     Past Medical History:   Diagnosis Date    Anemia     Anxiety     Cirrhosis     Colon polyp 3 yrs ago    Depression     Diabetes mellitus     Disease of thyroid gland     Diverticulitis of colon 8 yrs ago    Gout     Gout     History of transfusion     Hyperlipidemia     Hypertension     Liver cirrhosis     Multiple gestation     Osteoarthritis     Urinary tract infection        Past Surgical History:   Procedure Laterality Date    BILATERAL SALPINGO OOPHORECTOMY      HYSTERECTOMY      LAVH    LAPAROSCOPIC ASSISTED VAGINAL HYSTERECTOMY SALPINGO OOPHORECTOMY      PARATHYROIDECTOMY      SPLENECTOMY         Family History   Problem Relation Age of Onset    Colon cancer Mother     Hypertension Mother     Coronary artery disease Father     Diabetes Sister     Arthritis Daughter     Depression Daughter     Stroke Maternal Grandmother     Breast cancer Neg Hx         Social History     Tobacco Use    Smoking status: Never     Passive exposure: Never    Smokeless tobacco: Never   Vaping Use    Vaping status: Never Used   Substance Use Topics    Alcohol use: No    Drug use: No       (Not in a hospital admission)      Atorvastatin, Lipitor [atorvastatin calcium], Contrast dye (echo or unknown ct/mr), Peanut-containing drug products, Ciprofloxacin, Nsaids, and Sulfa antibiotics    Current Outpatient Medications on File Prior to Visit   Medication Sig Dispense Refill    allopurinol (ZYLOPRIM) 100 MG tablet       Banophen 25 MG capsule       cholestyramine (QUESTRAN) 4 GM/DOSE powder       Estrogens Conjugated (Premarin) 0.625 MG/GM vaginal cream 0.5 GRAMS INSERT  "VAGINALLY AT BEDTIME TWICE WEEKLY 30 g 11    famotidine (PEPCID) 20 MG tablet Take 1 tablet by mouth 2 (Two) Times a Day. (Patient not taking: Reported on 11/25/2024)      ferrous sulfate 325 (65 FE) MG tablet Take 1 tablet by mouth Daily.      fluticasone (FLONASE) 50 MCG/ACT nasal spray       levocetirizine (XYZAL) 5 MG tablet       losartan (COZAAR) 50 MG tablet       metFORMIN ER (GLUCOPHAGE-XR) 500 MG 24 hr tablet       montelukast (SINGULAIR) 10 MG tablet       NASAL DECONGESTANT 30 MG tablet   0    omega-3 acid ethyl esters (LOVAZA) 1 g capsule Take 4 capsules by mouth Daily.  0    ONE TOUCH ULTRA TEST test strip   3    Ozempic, 1 MG/DOSE, 4 MG/3ML solution pen-injector  (Patient not taking: Reported on 11/25/2024)      sertraline (ZOLOFT) 25 MG tablet  (Patient not taking: Reported on 11/25/2024)      Synthroid 88 MCG tablet       ursodiol (ACTIGALL) 300 MG capsule Take 4 capsules by mouth Daily.  5    vitamin D (ERGOCALCIFEROL) 14527 units capsule capsule        No current facility-administered medications on file prior to visit.       Social History    Tobacco Use      Smoking status: Never        Passive exposure: Never      Smokeless tobacco: Never         Objective   /74   Ht 160 cm (63\")   Wt 80.7 kg (178 lb)   LMP  (LMP Unknown)   BMI 31.53 kg/m²     Physical Exam:  General Appearance: alert, pleasant, appears stated age, interactive and cooperative  Breasts: Not performed.  Abdomen: no masses, no hepatomegaly, no splenomegaly, soft non-tender, no guarding and no rebound tenderness  Pelvis:  Pelvic: Clinical staff was present for exam  External genitalia: Hypopigmentation and irritation noted  :  urethral meatus normal;  Vagina:  atrophic mucosal changes are present;         Medical Decision Making:    Assessment   Lichen sclerosus  Vulvovaginal candidiasis  Vaginal + vulvar irritation  Dysuria     Plan    Orders Placed This Encounter   Procedures    Nuab VG+ - Swab, Vagina     Order " Specific Question:   Release to patient     Answer:   Routine Release [[id number]]    Urine culture, Comprehensive - ,    Urinalysis With Culture If Indicated - Urine, Clean Catch     Order Specific Question:   Release to patient     Answer:   Routine Release [[id number]]    Microscopic Examination -       Medication Management: Diflucan + clobetasol ointment    Procedures Performed: None    We reviewed her symptoms and exam findings in detail today.  Start empiric treatment for yeast and LS as detailed above.  Cultures collected from the vagina and urine today.  We will follow-up results by phone.  All questions answered.    Richie Jeronimo MD  Obstetrics and Gynecology  [facility]

## 2025-04-18 ENCOUNTER — OFFICE VISIT (OUTPATIENT)
Dept: OBSTETRICS AND GYNECOLOGY | Facility: CLINIC | Age: 65
End: 2025-04-18
Payer: COMMERCIAL

## 2025-04-18 VITALS
SYSTOLIC BLOOD PRESSURE: 128 MMHG | WEIGHT: 184 LBS | HEIGHT: 65 IN | DIASTOLIC BLOOD PRESSURE: 70 MMHG | BODY MASS INDEX: 30.66 KG/M2

## 2025-04-18 DIAGNOSIS — Z13.820 OSTEOPOROSIS SCREENING: ICD-10-CM

## 2025-04-18 DIAGNOSIS — N90.4 LICHEN SCLEROSUS OF FEMALE GENITALIA: ICD-10-CM

## 2025-04-18 DIAGNOSIS — N95.2 POSTMENOPAUSAL ATROPHIC VAGINITIS: ICD-10-CM

## 2025-04-18 DIAGNOSIS — Z01.411 ENCOUNTER FOR GYNECOLOGICAL EXAMINATION (GENERAL) (ROUTINE) WITH ABNORMAL FINDINGS: Primary | ICD-10-CM

## 2025-04-18 DIAGNOSIS — N39.0 FREQUENT UTI: ICD-10-CM

## 2025-04-18 DIAGNOSIS — Z12.31 ENCOUNTER FOR SCREENING MAMMOGRAM FOR BREAST CANCER: ICD-10-CM

## 2025-04-18 DIAGNOSIS — R39.9 URINARY TRACT INFECTION SYMPTOMS: ICD-10-CM

## 2025-04-18 RX ORDER — CONJUGATED ESTROGENS 0.62 MG/G
CREAM VAGINAL
Qty: 30 G | Refills: 11 | Status: SHIPPED | OUTPATIENT
Start: 2025-04-18

## 2025-04-18 RX ORDER — TRETINOIN 0.025 %
CREAM (GRAM) TOPICAL
COMMUNITY
Start: 2025-02-05

## 2025-04-18 RX ORDER — CLOBETASOL PROPIONATE 0.5 MG/G
OINTMENT TOPICAL
Qty: 60 G | Refills: 6 | Status: SHIPPED | OUTPATIENT
Start: 2025-04-18

## 2025-04-18 RX ORDER — ASCORBIC ACID 500 MG
500 TABLET ORAL
COMMUNITY
Start: 2025-03-27 | End: 2026-03-27

## 2025-04-18 RX ORDER — ESTRADIOL 10 UG/1
1 TABLET, FILM COATED VAGINAL DAILY
Qty: 8 TABLET | Refills: 11 | Status: SHIPPED | OUTPATIENT
Start: 2025-04-18

## 2025-04-18 NOTE — PROGRESS NOTES
Chief Complaint  Gynecologic Exam     History of Present Illness:  Patient is 65 y.o.  who presents to [facility] here for her annual examination.  Patient also here for follow-up evaluation of her vaginal atrophy and vulvar itching and irritation.  Patient has continued with her clobetasol.  She has also been using her Premarin cream twice weekly.  She does report having frequent UTIs.  She has been treated for 2 within the last several months.  She does report the estrogen cream does not appear to stay in place.  She will be due for her mammogram in July.  She has not had a bone density scan since .  She sees Dr. Wilhelm for her primary care.  She has been on calcium and vitamin D supplementation.  Patient did have recent labs in March.  Patient did have liver scan last year at  as well.    History  Past Medical History:   Diagnosis Date    Anemia     Anxiety     Cirrhosis     Colon polyp 3 yrs ago    Depression     Diabetes mellitus     Disease of thyroid gland     Diverticulitis of colon 8 yrs ago    Gout     Gout     History of transfusion     Hyperlipidemia     Hypertension     Interstitial cystitis ’s    Liver cirrhosis     Multiple gestation     Osteoarthritis     Urinary incontinence     Urinary tract infection      Current Outpatient Medications on File Prior to Visit   Medication Sig Dispense Refill    ascorbic acid (VITAMIN C) 500 MG tablet Take 1 tablet by mouth.      Retin-A 0.025 % cream       allopurinol (ZYLOPRIM) 100 MG tablet       amitriptyline (ELAVIL) 10 MG tablet Take 1/2 tablet nightly and can increase to 1 tablet nightly if needed 30 tablet 6    Azelastine HCl 137 MCG/SPRAY solution       Banophen 25 MG capsule       Cholecalciferol 50 MCG (2000) capsule Take 1 capsule by mouth.      cholestyramine (QUESTRAN) 4 GM/DOSE powder       ciclopirox (PENLAC) 8 % solution       cyanocobalamin (VITAMIN B-12) 50 MCG tablet Take 1 tablet by mouth  Daily.      EPINEPHrine (EPIPEN) 0.3 MG/0.3ML solution auto-injector injection       famotidine (PEPCID) 20 MG tablet Take 1 tablet by mouth 2 (Two) Times a Day. (Patient not taking: Reported on 11/25/2024)      ferrous sulfate 325 (65 FE) MG tablet Take 1 tablet by mouth Daily.      fluconazole (Diflucan) 150 MG tablet Take 1 tablet by mouth Daily. 1 tablet 0    fluticasone (FLONASE) 50 MCG/ACT nasal spray       Lancets (OneTouch Delica Plus [id number]) misc       levocetirizine (XYZAL) 5 MG tablet       losartan (COZAAR) 50 MG tablet       metFORMIN ER (GLUCOPHAGE-XR) 500 MG 24 hr tablet       Meth-Hyo-M Bl-Na Phos-Ph Sal (Uro-MP) 118 MG capsule Take 1 capsule by mouth 2 (Two) Times a Day As Needed (bladder pain). 30 capsule 6    montelukast (SINGULAIR) 10 MG tablet       NASAL DECONGESTANT 30 MG tablet   0    omega-3 acid ethyl esters (LOVAZA) 1 g capsule Take 4 capsules by mouth Daily.  0    ONE TOUCH ULTRA TEST test strip   3    Ozempic, 0.25 or 0.5 MG/DOSE, 2 MG/3ML solution pen-injector       Ozempic, 1 MG/DOSE, 4 MG/3ML solution pen-injector  (Patient not taking: Reported on 11/25/2024)      predniSONE (DELTASONE) 10 MG tablet       Semaglutide, 1 MG/DOSE, (Ozempic, 1 MG/DOSE,) 4 MG/3ML solution pen-injector Inject 1 dose under the skin into the appropriate area as directed.      sertraline (ZOLOFT) 25 MG tablet  (Patient not taking: Reported on 11/25/2024)      sertraline (ZOLOFT) 50 MG tablet       Synthroid 88 MCG tablet       ursodiol (ACTIGALL) 300 MG capsule Take 4 capsules by mouth Daily.  5    valACYclovir (VALTREX) 1000 MG tablet       vitamin D (ERGOCALCIFEROL) 51470 units capsule capsule        No current facility-administered medications on file prior to visit.     Allergies   Allergen Reactions    Atorvastatin Other (See Comments)     Patient advised unable to move arms or legs.     Lipitor [Atorvastatin Calcium] Other (See Comments)     Patient advised unable to move arms or legs.     Contrast  "Dye (Echo Or Unknown Ct/Mr) Hives    Peanut-Containing Drug Products Swelling    Ciprofloxacin Rash    Nsaids Unknown (See Comments)    Sulfa Antibiotics Unknown (See Comments)     Past Surgical History:   Procedure Laterality Date    BILATERAL SALPINGO OOPHORECTOMY      HYSTERECTOMY      LAVH    LAPAROSCOPIC ASSISTED VAGINAL HYSTERECTOMY SALPINGO OOPHORECTOMY      PARATHYROIDECTOMY      SPLENECTOMY      TONSILLECTOMY  n/a    TOTAL ABDOMINAL HYSTERECTOMY WITH SALPINGO OOPHORECTOMY      WISDOM TOOTH EXTRACTION  N/a     Family History   Problem Relation Age of Onset    Colon cancer Mother     Hypertension Mother     Cancer Mother     Coronary artery disease Father     Heart disease Father     Diabetes Sister     Arthritis Daughter     Depression Daughter     Stroke Maternal Grandmother     Breast cancer Neg Hx      Social History     Socioeconomic History    Marital status:    Tobacco Use    Smoking status: Never     Passive exposure: Never    Smokeless tobacco: Never   Vaping Use    Vaping status: Never Used   Substance and Sexual Activity    Alcohol use: Never    Drug use: No    Sexual activity: Not Currently     Partners: Male     Birth control/protection: Hysterectomy, Surgical       Physical Examination:  Vital Signs: /70   Ht 165.1 cm (65\")   Wt 83.5 kg (184 lb)   BMI 30.62 kg/m²     General Appearance: alert, appears stated age, and cooperative  Breasts: Examined in supine position  Symmetric without masses or skin dimpling  Nipples normal without inversion, lesions or discharge  There are no palpable axillary nodes  Abdomen: no masses, no hepatomegaly, no splenomegaly, soft non-tender, no guarding, and no rebound tenderness  Pelvic: Clinical staff was present for exam; pelvic examination was required for evaluation  External genitalia:  normal appearance of the external genitalia including Bartholin's and [place]'s glands. +mild parchment of the vulva  :  urethral meatus normal;  Vaginal:  " atrophic mucosal changes are present;  Cervix:  absent.  Uterus:  absent.  Adnexa:  non palpable bilaterally.  Pap smear done and specimen sent using Thin-Prep technique    Data Review:  The following data was reviewed by: Jemima Weathers MD on 04/18/2025:     Labs:  CBC with automated diff (03/27/2025 09:11)  Iron and TIBC (03/27/2025 09:15)  FERRITIN (03/27/2025 09:15)  COMPREHENSIVE METABOLIC PANEL (03/27/2025 09:15)  Imaging:  Mammo Screening Digital Tomosynthesis Bilateral With CAD (07/11/2024 13:37)   US LIVER (09/27/2024 09:19)   Medical Records:  None    Assessment and Plan   1. Postmenopausal atrophic vaginitis  Discussed various options for relief of atrophic vaginal symptoms related to menopause. Discussed local therapy for treatment of vaginal symptoms only.  Discussed the different formulation options including cream, ring, and tablets.  Discussed the low risk of systemic absorption in postmenopausal women with atrophy using 25 mcgs of estradiol on a daily basis.  Recommend low dose use 2-3x/wk for maintenance of treatment.  Other treatment options were discussed including the use of water-based and silicone-based vaginal lubricants and moisturizers.  Also discussed was the FDA approved treatment option of ospemifene for moderate to severe dyspareunia.  Patient with continued atrophic changes.  Will plan a trial with Vagifem as discussed.  Instructions and precautions have been given.  - Estrogens Conjugated (Premarin) 0.625 MG/GM vaginal cream; 0.5 GRAMS INSERT VAGINALLY AT BEDTIME TWICE WEEKLY  Dispense: 30 g; Refill: 11  - estradiol (Vagifem) 10 MCG tablet vaginal tablet; Insert 1 tablet into the vagina Daily.  Dispense: 8 tablet; Refill: 11    2. Lichen sclerosus of female genitalia  The condition of lichen sclerosus was discussed with patient including the benign, chronic, progressive nature of the dermatologic condition.  Signs and symptoms were reviewed.  The patient was informed the only  definitive diagnosis is pathologic with a vulvar biopsy.  The expectations of the disease was discussed including the chronicity with frequent recurrences and remissions.  Vulvar hygiene was discussed including conditions to avoid.  Treatment with a topical steroid, clobetasol 0.05% ointment, was discussed with instructions and precautions for use discussed.   - clobetasol (TEMOVATE) 0.05 % ointment; Apply a dime-sized amount to affected area nightly for 6 weeks, then 1-3 times per week thereafter  Dispense: 60 g; Refill: 6    3. Encounter for gynecological examination (general) (routine) with abnormal findings  Pap was done today.  If she does not receive the results of the Pap within 2 weeks  time, she was instructed to call to find out the results.  I explained to Nadine that the recommendations for Pap smear interval in a low risk patient has lengthened to 3 years time if cytology alone normal or  5 years time if both cytology and HPV testing were normal.  I encouraged her to be seen yearly for a full physical exam including breast and pelvic exam even during the off years when PAP's will not be performed.   - LIQUID-BASED PAP SMEAR WITH HPV GENOTYPING IF ASCUS ([facility])    4. Encounter for screening mammogram for breast cancer  It is recommended per ACOG, for women at average risk to start annual mammogram screening at the age of 40 until the age of 75 and an individualized decision be made for women after age 75.  She was encouraged to continue getting yearly mammograms.  She should report any palpable breast lump(s) or skin changes regardless of mammographic findings.  I explained to Nadine that notification regarding her mammogram results will come from the center performing the study.  Our office will not be routinely calling with mammogram results.  It is her responsibility to make sure that the results from the mammogram are communicated to her by the breast center.  If she has any questions about the  results, she is welcome to call our office anytime.  The patient reports she will schedule her mammogram.    Nadine was counseled regarding having clinical breast exams and breast self-awareness.  Women aged 29-39 years of age should have clinical breast exams every 1-3 years and yearly aged 40 and older.  The patient was counseled regarding breast self-awareness focusing on having a sense of what is normal for her breasts so that she can tell if there are changes.  Even small changes should be reported to provider.   - Mammo Screening Digital Tomosynthesis Bilateral With CAD; Future    5. Osteoporosis screening  Bone density testing was recommended.  It is recommended that all women should begin DEXA screening at age 65 years and screening can be used selectively for women younger who have risk factors.   I reviewed with Nadine that it was always most advisable for all bone density tests for each patient to be done on the same machine over time.  The purpose of this is to improve the accuracy of the interpretation of serial studies.   - DEXA Bone Density Axial; Future    6. Urinary tract infection symptoms  Will send urine for clean-catch UA culture and sensitivity.  Patient to call for her culture results.  - Urinalysis With Microscopic - Urine, Clean Catch  - Urine Culture - Urine, Urine, Clean Catch    7. Frequent UTI  Prescriptions given for estrogen cream.  Patient is instructed to apply to the periurethral area 2-3 times weekly.  Culture obtained today as well.  - Estrogens Conjugated (Premarin) 0.625 MG/GM vaginal cream; 0.5 GRAMS INSERT VAGINALLY AT BEDTIME TWICE WEEKLY  Dispense: 30 g; Refill: 11  - Urinalysis With Microscopic - Urine, Clean Catch  - Urine Culture - Urine, Urine, Clean Catch    Follow Up/Instructions:  Follow up as noted.  Patient was given instructions and counseling regarding her condition or for health maintenance advice. Please see specific information pulled into the AVS if appropriate.      Note: Speech recognition transcription software may have been used to dictate portions of this document.  An attempt at proofreading has been made though minor errors in transcription may still be present.    This note was electronically signed.  Jemima Weathers M.D.

## 2025-04-20 LAB
APPEARANCE UR: ABNORMAL
BACTERIA #/AREA URNS HPF: ABNORMAL /HPF
BACTERIA UR CULT: NORMAL
BACTERIA UR CULT: NORMAL
BILIRUB UR QL STRIP: NEGATIVE
COLOR UR: YELLOW
CRYSTALS URNS MICRO: ABNORMAL
EPI CELLS #/AREA URNS HPF: ABNORMAL /HPF
GLUCOSE UR QL STRIP: NEGATIVE
HGB UR QL STRIP: NEGATIVE
KETONES UR QL STRIP: NEGATIVE
LEUKOCYTE ESTERASE UR QL STRIP: ABNORMAL
NITRITE UR QL STRIP: NEGATIVE
PH UR STRIP: 5.5 [PH] (ref 5–8)
PROT UR QL STRIP: NEGATIVE
RBC #/AREA URNS HPF: ABNORMAL /HPF
SP GR UR STRIP: 1.02 (ref 1–1.03)
UROBILINOGEN UR STRIP-MCNC: ABNORMAL MG/DL
WBC #/AREA URNS HPF: ABNORMAL /HPF

## 2025-04-22 LAB — REF LAB TEST METHOD: NORMAL

## 2025-05-06 ENCOUNTER — TELEPHONE (OUTPATIENT)
Dept: OBSTETRICS AND GYNECOLOGY | Facility: CLINIC | Age: 65
End: 2025-05-06
Payer: COMMERCIAL

## 2025-05-06 NOTE — TELEPHONE ENCOUNTER
This patient called asking for urine results from 4/25 and they were not in the system that I could find. Patient stated someone from the office advised her to come in and leave a sample-so she did. Can you please advise-if I missed it?

## 2025-08-15 ENCOUNTER — HOSPITAL ENCOUNTER (OUTPATIENT)
Dept: MAMMOGRAPHY | Facility: HOSPITAL | Age: 65
Discharge: HOME | End: 2025-08-15
Admitting: OBSTETRICS & GYNECOLOGY
Payer: COMMERCIAL

## 2025-08-15 DIAGNOSIS — Z12.31 ENCOUNTER FOR SCREENING MAMMOGRAM FOR BREAST CANCER: ICD-10-CM

## 2025-08-15 PROCEDURE — 77067 SCR MAMMO BI INCL CAD: CPT

## 2025-08-15 PROCEDURE — 77063 BREAST TOMOSYNTHESIS BI: CPT
